# Patient Record
Sex: FEMALE | Race: WHITE | NOT HISPANIC OR LATINO
[De-identification: names, ages, dates, MRNs, and addresses within clinical notes are randomized per-mention and may not be internally consistent; named-entity substitution may affect disease eponyms.]

---

## 2016-11-24 RX ORDER — LANSOPRAZOLE 15 MG/1
30 CAPSULE, DELAYED RELEASE ORAL
Qty: 0 | Refills: 0 | DISCHARGE
Start: 2016-11-24

## 2017-01-05 PROBLEM — A04.7 C. DIFFICILE DIARRHEA: Status: RESOLVED | Noted: 2017-01-05 | Resolved: 2017-01-05

## 2017-01-05 PROBLEM — Z86.39 HISTORY OF HYPOTHYROIDISM: Status: RESOLVED | Noted: 2017-01-05 | Resolved: 2017-01-05

## 2017-01-05 PROBLEM — Z86.39 HISTORY OF HYPERLIPIDEMIA: Status: RESOLVED | Noted: 2017-01-05 | Resolved: 2017-01-05

## 2017-01-05 PROBLEM — Z95.5 S/P CORONARY ARTERY STENT PLACEMENT: Status: ACTIVE | Noted: 2017-01-05

## 2017-01-05 PROBLEM — I25.119 CORONARY ARTERY DISEASE INVOLVING NATIVE CORONARY ARTERY OF NATIVE HEART WITH ANGINA PECTORIS: Status: RESOLVED | Noted: 2017-01-05 | Resolved: 2017-01-05

## 2017-01-24 ENCOUNTER — MESSAGE (OUTPATIENT)
Age: 77
End: 2017-01-24

## 2017-02-19 ENCOUNTER — EMERGENCY (EMERGENCY)
Facility: HOSPITAL | Age: 77
LOS: 1 days | Discharge: HOME | End: 2017-02-19
Attending: EMERGENCY MEDICINE
Payer: MEDICARE

## 2017-02-19 VITALS
OXYGEN SATURATION: 97 % | SYSTOLIC BLOOD PRESSURE: 119 MMHG | RESPIRATION RATE: 20 BRPM | DIASTOLIC BLOOD PRESSURE: 69 MMHG | TEMPERATURE: 98 F | HEART RATE: 88 BPM

## 2017-02-19 VITALS
OXYGEN SATURATION: 97 % | TEMPERATURE: 98 F | SYSTOLIC BLOOD PRESSURE: 125 MMHG | HEART RATE: 102 BPM | WEIGHT: 171.08 LBS | DIASTOLIC BLOOD PRESSURE: 88 MMHG | HEIGHT: 66 IN | RESPIRATION RATE: 24 BRPM

## 2017-02-19 DIAGNOSIS — Z91.040 LATEX ALLERGY STATUS: ICD-10-CM

## 2017-02-19 DIAGNOSIS — Z90.49 ACQUIRED ABSENCE OF OTHER SPECIFIED PARTS OF DIGESTIVE TRACT: Chronic | ICD-10-CM

## 2017-02-19 DIAGNOSIS — Z88.0 ALLERGY STATUS TO PENICILLIN: ICD-10-CM

## 2017-02-19 DIAGNOSIS — Z79.82 LONG TERM (CURRENT) USE OF ASPIRIN: ICD-10-CM

## 2017-02-19 DIAGNOSIS — F43.20 ADJUSTMENT DISORDER, UNSPECIFIED: ICD-10-CM

## 2017-02-19 DIAGNOSIS — Z90.89 ACQUIRED ABSENCE OF OTHER ORGANS: ICD-10-CM

## 2017-02-19 DIAGNOSIS — R00.2 PALPITATIONS: ICD-10-CM

## 2017-02-19 DIAGNOSIS — I10 ESSENTIAL (PRIMARY) HYPERTENSION: ICD-10-CM

## 2017-02-19 DIAGNOSIS — Z88.7 ALLERGY STATUS TO SERUM AND VACCINE: ICD-10-CM

## 2017-02-19 DIAGNOSIS — Z90.89 ACQUIRED ABSENCE OF OTHER ORGANS: Chronic | ICD-10-CM

## 2017-02-19 DIAGNOSIS — I25.10 ATHEROSCLEROTIC HEART DISEASE OF NATIVE CORONARY ARTERY WITHOUT ANGINA PECTORIS: ICD-10-CM

## 2017-02-19 LAB
ALBUMIN SERPL ELPH-MCNC: 4.4 G/DL — SIGNIFICANT CHANGE UP (ref 3.3–5.2)
ALP SERPL-CCNC: 49 U/L — SIGNIFICANT CHANGE UP (ref 40–120)
ALT FLD-CCNC: 22 U/L — SIGNIFICANT CHANGE UP
ANION GAP SERPL CALC-SCNC: 17 MMOL/L — SIGNIFICANT CHANGE UP (ref 5–17)
AST SERPL-CCNC: 22 U/L — SIGNIFICANT CHANGE UP
BASOPHILS # BLD AUTO: 0 K/UL — SIGNIFICANT CHANGE UP (ref 0–0.2)
BASOPHILS NFR BLD AUTO: 0.4 % — SIGNIFICANT CHANGE UP (ref 0–2)
BILIRUB SERPL-MCNC: 0.2 MG/DL — LOW (ref 0.4–2)
BUN SERPL-MCNC: 31 MG/DL — HIGH (ref 8–20)
CALCIUM SERPL-MCNC: 9.6 MG/DL — SIGNIFICANT CHANGE UP (ref 8.6–10.2)
CHLORIDE SERPL-SCNC: 100 MMOL/L — SIGNIFICANT CHANGE UP (ref 98–107)
CK SERPL-CCNC: 30 U/L — SIGNIFICANT CHANGE UP (ref 25–170)
CO2 SERPL-SCNC: 25 MMOL/L — SIGNIFICANT CHANGE UP (ref 22–29)
CREAT SERPL-MCNC: 0.8 MG/DL — SIGNIFICANT CHANGE UP (ref 0.5–1.3)
EOSINOPHIL # BLD AUTO: 0.1 K/UL — SIGNIFICANT CHANGE UP (ref 0–0.5)
EOSINOPHIL NFR BLD AUTO: 1.6 % — SIGNIFICANT CHANGE UP (ref 0–6)
GLUCOSE SERPL-MCNC: 109 MG/DL — SIGNIFICANT CHANGE UP (ref 70–115)
HCT VFR BLD CALC: 40.6 % — SIGNIFICANT CHANGE UP (ref 37–47)
HGB BLD-MCNC: 13.6 G/DL — SIGNIFICANT CHANGE UP (ref 12–16)
LYMPHOCYTES # BLD AUTO: 1.8 K/UL — SIGNIFICANT CHANGE UP (ref 1–4.8)
LYMPHOCYTES # BLD AUTO: 32.5 % — SIGNIFICANT CHANGE UP (ref 20–55)
MCHC RBC-ENTMCNC: 30.6 PG — SIGNIFICANT CHANGE UP (ref 27–31)
MCHC RBC-ENTMCNC: 33.5 G/DL — SIGNIFICANT CHANGE UP (ref 32–36)
MCV RBC AUTO: 91.2 FL — SIGNIFICANT CHANGE UP (ref 81–99)
MONOCYTES # BLD AUTO: 0.5 K/UL — SIGNIFICANT CHANGE UP (ref 0–0.8)
MONOCYTES NFR BLD AUTO: 8.6 % — SIGNIFICANT CHANGE UP (ref 3–10)
NEUTROPHILS # BLD AUTO: 3.2 K/UL — SIGNIFICANT CHANGE UP (ref 1.8–8)
NEUTROPHILS NFR BLD AUTO: 56.7 % — SIGNIFICANT CHANGE UP (ref 37–73)
PLATELET # BLD AUTO: 191 K/UL — SIGNIFICANT CHANGE UP (ref 150–400)
POTASSIUM SERPL-MCNC: 3.5 MMOL/L — SIGNIFICANT CHANGE UP (ref 3.5–5.3)
POTASSIUM SERPL-SCNC: 3.5 MMOL/L — SIGNIFICANT CHANGE UP (ref 3.5–5.3)
PROT SERPL-MCNC: 6.8 G/DL — SIGNIFICANT CHANGE UP (ref 6.6–8.7)
RBC # BLD: 4.45 M/UL — SIGNIFICANT CHANGE UP (ref 4.4–5.2)
RBC # FLD: 12.7 % — SIGNIFICANT CHANGE UP (ref 11–15.6)
SODIUM SERPL-SCNC: 142 MMOL/L — SIGNIFICANT CHANGE UP (ref 135–145)
TROPONIN T SERPL-MCNC: <0.01 NG/ML — SIGNIFICANT CHANGE UP (ref 0–0.06)
WBC # BLD: 5.7 K/UL — SIGNIFICANT CHANGE UP (ref 4.8–10.8)
WBC # FLD AUTO: 5.7 K/UL — SIGNIFICANT CHANGE UP (ref 4.8–10.8)

## 2017-02-19 PROCEDURE — 93005 ELECTROCARDIOGRAM TRACING: CPT

## 2017-02-19 PROCEDURE — 82550 ASSAY OF CK (CPK): CPT

## 2017-02-19 PROCEDURE — 84484 ASSAY OF TROPONIN QUANT: CPT

## 2017-02-19 PROCEDURE — 85027 COMPLETE CBC AUTOMATED: CPT

## 2017-02-19 PROCEDURE — 99285 EMERGENCY DEPT VISIT HI MDM: CPT

## 2017-02-19 PROCEDURE — 99283 EMERGENCY DEPT VISIT LOW MDM: CPT | Mod: 25

## 2017-02-19 PROCEDURE — 80053 COMPREHEN METABOLIC PANEL: CPT

## 2017-02-19 PROCEDURE — 93010 ELECTROCARDIOGRAM REPORT: CPT

## 2017-02-19 NOTE — ED ADULT TRIAGE NOTE - CHIEF COMPLAINT QUOTE
pt c/o sob s/p arugment with relative denies chest pain skin warm and dry. pt states she had stents placed on thanksgiving 2016

## 2017-02-19 NOTE — ED ADULT NURSE NOTE - OBJECTIVE STATEMENT
rcd pt on stretcher, a/ox3, in no apparent distress, states she had gotten into argument with daughter and afterwards "felt a rapid heartbeat and found it difficult to breath, I had 3 glasses of wine tonight" pt now with even and unlabored respirations, HR controlled a fib on cardiac monitor, denies chest pain or dizziness, pt with h/o of "heart attack on thanksgiving 2016"

## 2017-02-19 NOTE — ED PROVIDER NOTE - OBJECTIVE STATEMENT
This patient is a 77 year old woman with a past medical history of HTN and CAD with 2 stents who presents to the ED c/o palpitations and SOB after an argument with her daughter.  Patient states that she "lost it" and screamed, yelled, and threw a chair.  She was This patient is a 77 year old woman with a past medical history of HTN and CAD with 2 stents who presents to the ED c/o palpitations and SOB after an argument with her daughter.  Patient states that she "lost it" and screamed, yelled, and threw a chair.  She states that the SOB has improved since being transported to the ED and waiting in the ED.  The palpitations have decreased but are still present.  Patient states that she has not had an outburst like this in over 30 years.

## 2017-03-28 ENCOUNTER — APPOINTMENT (OUTPATIENT)
Dept: CARDIOLOGY | Facility: CLINIC | Age: 77
End: 2017-03-28

## 2017-03-28 ENCOUNTER — NON-APPOINTMENT (OUTPATIENT)
Age: 77
End: 2017-03-28

## 2017-03-28 VITALS
WEIGHT: 169 LBS | DIASTOLIC BLOOD PRESSURE: 80 MMHG | HEIGHT: 66 IN | BODY MASS INDEX: 27.16 KG/M2 | SYSTOLIC BLOOD PRESSURE: 116 MMHG | OXYGEN SATURATION: 98 % | HEART RATE: 91 BPM

## 2017-05-05 ENCOUNTER — EMERGENCY (EMERGENCY)
Facility: HOSPITAL | Age: 77
LOS: 1 days | Discharge: HOME | End: 2017-05-05
Attending: EMERGENCY MEDICINE
Payer: MEDICARE

## 2017-05-05 ENCOUNTER — APPOINTMENT (OUTPATIENT)
Dept: CARDIOLOGY | Facility: CLINIC | Age: 77
End: 2017-05-05

## 2017-05-05 VITALS
DIASTOLIC BLOOD PRESSURE: 99 MMHG | SYSTOLIC BLOOD PRESSURE: 146 MMHG | WEIGHT: 169.09 LBS | RESPIRATION RATE: 20 BRPM | TEMPERATURE: 98 F | HEIGHT: 66 IN | HEART RATE: 70 BPM | OXYGEN SATURATION: 99 %

## 2017-05-05 VITALS — OXYGEN SATURATION: 97 % | DIASTOLIC BLOOD PRESSURE: 79 MMHG | HEART RATE: 71 BPM | SYSTOLIC BLOOD PRESSURE: 128 MMHG

## 2017-05-05 VITALS
RESPIRATION RATE: 20 BRPM | HEART RATE: 86 BPM | OXYGEN SATURATION: 99 % | SYSTOLIC BLOOD PRESSURE: 148 MMHG | DIASTOLIC BLOOD PRESSURE: 89 MMHG

## 2017-05-05 DIAGNOSIS — R23.3 SPONTANEOUS ECCHYMOSES: ICD-10-CM

## 2017-05-05 DIAGNOSIS — Z91.040 LATEX ALLERGY STATUS: ICD-10-CM

## 2017-05-05 DIAGNOSIS — Z90.49 ACQUIRED ABSENCE OF OTHER SPECIFIED PARTS OF DIGESTIVE TRACT: ICD-10-CM

## 2017-05-05 DIAGNOSIS — Z88.7 ALLERGY STATUS TO SERUM AND VACCINE: ICD-10-CM

## 2017-05-05 DIAGNOSIS — I10 ESSENTIAL (PRIMARY) HYPERTENSION: ICD-10-CM

## 2017-05-05 DIAGNOSIS — Z79.82 LONG TERM (CURRENT) USE OF ASPIRIN: ICD-10-CM

## 2017-05-05 DIAGNOSIS — Z90.49 ACQUIRED ABSENCE OF OTHER SPECIFIED PARTS OF DIGESTIVE TRACT: Chronic | ICD-10-CM

## 2017-05-05 DIAGNOSIS — Z90.89 ACQUIRED ABSENCE OF OTHER ORGANS: ICD-10-CM

## 2017-05-05 DIAGNOSIS — Z90.89 ACQUIRED ABSENCE OF OTHER ORGANS: Chronic | ICD-10-CM

## 2017-05-05 DIAGNOSIS — Z88.0 ALLERGY STATUS TO PENICILLIN: ICD-10-CM

## 2017-05-05 LAB
ALBUMIN SERPL ELPH-MCNC: 4.6 G/DL — SIGNIFICANT CHANGE UP (ref 3.3–5.2)
ALP SERPL-CCNC: 50 U/L — SIGNIFICANT CHANGE UP (ref 40–120)
ALT FLD-CCNC: 28 U/L — SIGNIFICANT CHANGE UP
ANION GAP SERPL CALC-SCNC: 11 MMOL/L — SIGNIFICANT CHANGE UP (ref 5–17)
APTT BLD: 33.1 SEC — SIGNIFICANT CHANGE UP (ref 27.5–37.4)
AST SERPL-CCNC: 28 U/L — SIGNIFICANT CHANGE UP
BILIRUB SERPL-MCNC: 0.6 MG/DL — SIGNIFICANT CHANGE UP (ref 0.4–2)
BUN SERPL-MCNC: 26 MG/DL — HIGH (ref 8–20)
CALCIUM SERPL-MCNC: 10.4 MG/DL — HIGH (ref 8.6–10.2)
CHLORIDE SERPL-SCNC: 99 MMOL/L — SIGNIFICANT CHANGE UP (ref 98–107)
CO2 SERPL-SCNC: 30 MMOL/L — HIGH (ref 22–29)
CREAT SERPL-MCNC: 0.8 MG/DL — SIGNIFICANT CHANGE UP (ref 0.5–1.3)
GLUCOSE SERPL-MCNC: 117 MG/DL — HIGH (ref 70–115)
HCT VFR BLD CALC: 44 % — SIGNIFICANT CHANGE UP (ref 37–47)
HGB BLD-MCNC: 14.6 G/DL — SIGNIFICANT CHANGE UP (ref 12–16)
INR BLD: 1.31 RATIO — HIGH (ref 0.88–1.16)
MCHC RBC-ENTMCNC: 30.7 PG — SIGNIFICANT CHANGE UP (ref 27–31)
MCHC RBC-ENTMCNC: 33.2 G/DL — SIGNIFICANT CHANGE UP (ref 32–36)
MCV RBC AUTO: 92.4 FL — SIGNIFICANT CHANGE UP (ref 81–99)
PLATELET # BLD AUTO: 194 K/UL — SIGNIFICANT CHANGE UP (ref 150–400)
POTASSIUM SERPL-MCNC: 4.1 MMOL/L — SIGNIFICANT CHANGE UP (ref 3.5–5.3)
POTASSIUM SERPL-SCNC: 4.1 MMOL/L — SIGNIFICANT CHANGE UP (ref 3.5–5.3)
PROT SERPL-MCNC: 7.7 G/DL — SIGNIFICANT CHANGE UP (ref 6.6–8.7)
PROTHROM AB SERPL-ACNC: 14.5 SEC — HIGH (ref 9.8–12.7)
RBC # BLD: 4.76 M/UL — SIGNIFICANT CHANGE UP (ref 4.4–5.2)
RBC # FLD: 13 % — SIGNIFICANT CHANGE UP (ref 11–15.6)
SODIUM SERPL-SCNC: 140 MMOL/L — SIGNIFICANT CHANGE UP (ref 135–145)
WBC # BLD: 5.2 K/UL — SIGNIFICANT CHANGE UP (ref 4.8–10.8)
WBC # FLD AUTO: 5.2 K/UL — SIGNIFICANT CHANGE UP (ref 4.8–10.8)

## 2017-05-05 PROCEDURE — 99283 EMERGENCY DEPT VISIT LOW MDM: CPT | Mod: 25

## 2017-05-05 PROCEDURE — 85610 PROTHROMBIN TIME: CPT

## 2017-05-05 PROCEDURE — 85027 COMPLETE CBC AUTOMATED: CPT

## 2017-05-05 PROCEDURE — 80053 COMPREHEN METABOLIC PANEL: CPT

## 2017-05-05 PROCEDURE — 85730 THROMBOPLASTIN TIME PARTIAL: CPT

## 2017-05-05 PROCEDURE — 99284 EMERGENCY DEPT VISIT MOD MDM: CPT

## 2017-05-05 PROCEDURE — 93005 ELECTROCARDIOGRAM TRACING: CPT

## 2017-05-05 PROCEDURE — 93010 ELECTROCARDIOGRAM REPORT: CPT

## 2017-05-05 NOTE — CONSULT NOTE ADULT - ASSESSMENT
76 y/o F taking Xarelto and Plavix w/ PMHx of stented CAD and MI presents to the ED c/o diffused bruising throughout her body. Pt denies HA, SOB, fever, chills or any other complaints at this time.   CV ROS: patient denies cp , orthopnea, PND LE edema (+) brushing on legs, shins , and on road all systems reviewed otherwise negative except as above. Currently stable on exam bruising noted , ECG AF chronic  ,plts 194K.  Case discussed with Dr Franco  recommendation is to change Plavix from daily dosing to TIW --- M,W,F and follow-up in Cardiology clinic in two weeks, continue Xarelto and other CV medications at existing doses.

## 2017-05-05 NOTE — ED ADULT NURSE NOTE - OBJECTIVE STATEMENT
received pt AOx3 c/o increased bruising, pt on Xarelto and Plavix. started on blood thinners last november. denies any dark stools, hematuria, epistaxis, dizziness, SOB and CP. pt resp even unlabored, MAEx4, neuro intact. Small bruise noted under left eye and to right shin. denies any injury or trauma. Pt appears well in skin color. will continue to monitor.

## 2017-05-05 NOTE — ED PROVIDER NOTE - PROGRESS NOTE DETAILS
pt evaluated by Dr Ha and states d/w Dr Franco and recommend to continue with xarelto but change her plavix to 3 times per week and f/u with Gandotra in 2 wks.

## 2017-05-05 NOTE — ED PROVIDER NOTE - OBJECTIVE STATEMENT
76 y/o female in ED c/o easy bruising x 7 months since starting plavix and xarelto.  pt denies any GI bleeding.  pt denies any fever, HA, cp, sob, n/v/d/abd pain.  tolerating PO.  no sick contacts or recent travel.

## 2017-05-05 NOTE — CONSULT NOTE ADULT - SUBJECTIVE AND OBJECTIVE BOX
Patient is a 77y old  Female who presents with a chief complaint of     HPI:  78 y/o F taking Xarelto and Plavix w/ PMHx of stented CAD and MI presents to the ED c/o diffused bruising throughout her body. Pt denies HA, SOB, fever, chills or any other complaints at this time.   CV ROS: patient denies cp , orthopnea, PND LE edema (+) brushing on legs, shins , and on road all systems reviewed otherwise negative except as above    PAST MEDICAL & SURGICAL HISTORY:  Myocardial infarction  Essential hypertension  Afib  S/P appendectomy  S/P tonsillectomy      PREVIOUS DIAGNOSTIC TESTING:      ECHO  FINDINGS:11/28/2017     Summary:   1. Limited echo done at the bedside with  for pericardial   effusion.   2. There is no evidence of pericardial effusion.   3. Left ventricular ejection fraction, by visual estimation, is 55 to   60%.        CATHETERIZATION  FINDINGS 11/28/2017    :VENTRICLES: No LV gram was performed; however, a recent echocardiogram  demonstrated an EF of 50 %.  CORONARY VESSELS: The coronary circulation is right dominant.  LM:   --  LM: Normal.  LAD:   --  Mid LAD: There was a diffuse 70 % stenosis. There was MASOOD grade  3 flow through the vessel (brisk flow).  CX:   --  OM1: There was a 20 % stenosis. By IVUS. In a second lesion,  there was a 0 % stenosis in-stent.  RCA:   --  RCA: This vessel was not injected.      Allergies    latex (Anaphylaxis)  penicillin (Rash)  tetanus-diphtheria toxoids (Unknown)    Intolerances        MEDICATIONS  (STANDING):    MEDICATIONS  (PRN):      FAMILY HISTORY:  No pertinent family history in first degree relatives  No pertinent family history in first degree relatives      SOCIAL HISTORY: Lives with family    CIGARETTES:deneis    ALCOHOL:denies    REVIEW OF SYSTEMS:  CONSTITUTIONAL: No fever, weight loss, or fatigue  EYES: No eye pain, visual disturbances, or discharge  ENMT:  No difficulty hearing, tinnitus, vertigo; No sinus or throat pain  NECK: No pain or stiffness  RESPIRATORY: No cough, wheezing, chills or hemoptysis; No Shortness of Breath  CARDIOVASCULAR: No chest pain, palpitations, passing out, dizziness, or leg swelling  GASTROINTESTINAL: No abdominal or epigastric pain. No nausea, vomiting, or hematemesis; No diarrhea or constipation. No melena or hematochezia.  GENITOURINARY: No dysuria, frequency, hematuria, or incontinence  NEUROLOGICAL: No headaches, memory loss, loss of strength, numbness, or tremors  SKIN: No itching, burning, rashes, or lesions   LYMPH Nodes: No enlarged glands  ENDOCRINE: No heat or cold intolerance; No hair loss  MUSCULOSKELETAL: No joint pain or swelling; No muscle, back, or extremity pain  PSYCHIATRIC: No depression, anxiety, mood swings, or difficulty sleeping  HEME/LYMPH: No easy bruising, or bleeding gums  ALLERY AND IMMUNOLOGIC: No hives or eczema	    Vital Signs Last 24 Hrs  T(C): 36.7, Max: 36.7 (05-05 @ 11:33)  T(F): 98, Max: 98 (05-05 @ 11:33)  HR: 80 (70 - 80)  BP: 150/90 (146/99 - 150/90)  BP(mean): --  RR: 20 (20 - 20)  SpO2: 99% (99% - 99%)    Daily Height in cm: 167.64 (05 May 2017 11:33)    Daily     I&O's Detail      PHYSICAL EXAM:  Appearance: Normal, well nourished	  HEENT:   Normal oral mucosa, PERRL, EOMI, sclera non-icteric	  Lymphatic: No cervical lymphadenopathy  Cardiovascular: Normal S1 S2, No JVD, No cardiac murmurs, No carotid bruits, No peripheral edema  Respiratory: Lungs clear to auscultation	  Psychiatry: A & O x 3, Mood & affect appropriate  Gastrointestinal:  Soft, Non-tender, + BS, no bruits	  Skin: No rashes, No ecchymoses, No cyanosis  Neurologic: Grossly non-focal with full strength in all four extremities  Extremities: Normal range of motion, No clubbing, cyanosis or edema  Vascular: Peripheral pulses palpable 2+ bilaterally      INTERPRETATION OF TELEMETRY:    ECG: AR with ns st twa    LABS:                        14.6   5.2   )-----------( 194      ( 05 May 2017 12:27 )             44.0     05-05    140  |  99  |  26.0<H>  ----------------------------<  117<H>  4.1   |  30.0<H>  |  0.80    Ca    10.4<H>      05 May 2017 12:27    TPro  7.7  /  Alb  4.6  /  TBili  0.6  /  DBili  x   /  AST  28  /  ALT  28  /  AlkPhos  50  05-05        PT/INR - ( 05 May 2017 12:27 )   PT: 14.5 sec;   INR: 1.31 ratio         PTT - ( 05 May 2017 12:27 )  PTT:33.1 sec    I&O's Summary    BNP  RADIOLOGY & ADDITIONAL STUDIES:

## 2017-05-05 NOTE — ED STATDOCS - PROGRESS NOTE DETAILS
78 y/o F taking Xarelto and Plavix w/ PMHx of stented CAD and MI presents to the ED c/o diffused bruising throughout her body. Pt denies HA, SOB, fever, chills or any other complaints at this time. Pt aware of the risks of being on blood thinners. Her Cardiologist is Dr. Smyth. Tx: blood work, labs, and cardiology consult. Will obtain EKG. Pt will be sent to [facility] for further evaluation by another provider.

## 2017-05-05 NOTE — ED STATDOCS - DETAILS:
I, Zach Lui, performed the initial face to face bedside interview with this patient regarding history of present illness, review of symptoms and relevant past medical, social and family history.  I completed an independent physical examination.    The history, relevant review of systems, past medical and surgical history, medical decision making, and physical examination was documented by the scribe in my presence and I attest to the accuracy of the documentation.

## 2017-05-18 ENCOUNTER — APPOINTMENT (OUTPATIENT)
Dept: CARDIOLOGY | Facility: CLINIC | Age: 77
End: 2017-05-18

## 2017-05-18 ENCOUNTER — NON-APPOINTMENT (OUTPATIENT)
Age: 77
End: 2017-05-18

## 2017-05-18 VITALS
WEIGHT: 171 LBS | DIASTOLIC BLOOD PRESSURE: 69 MMHG | HEIGHT: 66 IN | OXYGEN SATURATION: 99 % | BODY MASS INDEX: 27.48 KG/M2 | HEART RATE: 79 BPM | SYSTOLIC BLOOD PRESSURE: 135 MMHG

## 2017-08-03 ENCOUNTER — APPOINTMENT (OUTPATIENT)
Dept: CARDIOLOGY | Facility: CLINIC | Age: 77
End: 2017-08-03
Payer: MEDICARE

## 2017-08-03 ENCOUNTER — NON-APPOINTMENT (OUTPATIENT)
Age: 77
End: 2017-08-03

## 2017-08-03 VITALS
DIASTOLIC BLOOD PRESSURE: 70 MMHG | WEIGHT: 169 LBS | HEART RATE: 84 BPM | OXYGEN SATURATION: 97 % | SYSTOLIC BLOOD PRESSURE: 109 MMHG | HEIGHT: 66 IN | BODY MASS INDEX: 27.16 KG/M2

## 2017-08-03 PROCEDURE — 99215 OFFICE O/P EST HI 40 MIN: CPT | Mod: 25

## 2017-08-03 PROCEDURE — 93000 ELECTROCARDIOGRAM COMPLETE: CPT

## 2017-09-11 ENCOUNTER — APPOINTMENT (OUTPATIENT)
Dept: ORTHOPEDIC SURGERY | Facility: CLINIC | Age: 77
End: 2017-09-11
Payer: MEDICARE

## 2017-09-11 VITALS
SYSTOLIC BLOOD PRESSURE: 148 MMHG | BODY MASS INDEX: 27.16 KG/M2 | WEIGHT: 169 LBS | HEART RATE: 79 BPM | HEIGHT: 66 IN | DIASTOLIC BLOOD PRESSURE: 87 MMHG

## 2017-09-11 DIAGNOSIS — Z86.79 PERSONAL HISTORY OF OTHER DISEASES OF THE CIRCULATORY SYSTEM: ICD-10-CM

## 2017-09-11 DIAGNOSIS — Z78.9 OTHER SPECIFIED HEALTH STATUS: ICD-10-CM

## 2017-09-11 DIAGNOSIS — Z86.39 PERSONAL HISTORY OF OTHER ENDOCRINE, NUTRITIONAL AND METABOLIC DISEASE: ICD-10-CM

## 2017-09-11 DIAGNOSIS — M19.041 PRIMARY OSTEOARTHRITIS, RIGHT HAND: ICD-10-CM

## 2017-09-11 DIAGNOSIS — Z87.39 PERSONAL HISTORY OF OTHER DISEASES OF THE MUSCULOSKELETAL SYSTEM AND CONNECTIVE TISSUE: ICD-10-CM

## 2017-09-11 PROCEDURE — 73130 X-RAY EXAM OF HAND: CPT | Mod: RT

## 2017-09-11 PROCEDURE — 99204 OFFICE O/P NEW MOD 45 MIN: CPT

## 2017-09-27 ENCOUNTER — OUTPATIENT (OUTPATIENT)
Dept: OUTPATIENT SERVICES | Facility: HOSPITAL | Age: 77
LOS: 1 days | End: 2017-09-27
Payer: MEDICARE

## 2017-09-27 VITALS
WEIGHT: 167.55 LBS | SYSTOLIC BLOOD PRESSURE: 118 MMHG | HEART RATE: 86 BPM | HEIGHT: 66 IN | TEMPERATURE: 98 F | DIASTOLIC BLOOD PRESSURE: 72 MMHG | RESPIRATION RATE: 16 BRPM

## 2017-09-27 DIAGNOSIS — Z90.49 ACQUIRED ABSENCE OF OTHER SPECIFIED PARTS OF DIGESTIVE TRACT: Chronic | ICD-10-CM

## 2017-09-27 DIAGNOSIS — Z01.818 ENCOUNTER FOR OTHER PREPROCEDURAL EXAMINATION: ICD-10-CM

## 2017-09-27 DIAGNOSIS — Z90.89 ACQUIRED ABSENCE OF OTHER ORGANS: Chronic | ICD-10-CM

## 2017-09-27 DIAGNOSIS — I10 ESSENTIAL (PRIMARY) HYPERTENSION: ICD-10-CM

## 2017-09-27 DIAGNOSIS — Z98.51 TUBAL LIGATION STATUS: Chronic | ICD-10-CM

## 2017-09-27 DIAGNOSIS — I48.91 UNSPECIFIED ATRIAL FIBRILLATION: ICD-10-CM

## 2017-09-27 DIAGNOSIS — M67.449 GANGLION, UNSPECIFIED HAND: ICD-10-CM

## 2017-09-27 DIAGNOSIS — Z82.49 FAMILY HISTORY OF ISCHEMIC HEART DISEASE AND OTHER DISEASES OF THE CIRCULATORY SYSTEM: Chronic | ICD-10-CM

## 2017-09-27 DIAGNOSIS — E78.00 PURE HYPERCHOLESTEROLEMIA, UNSPECIFIED: ICD-10-CM

## 2017-09-27 DIAGNOSIS — I21.3 ST ELEVATION (STEMI) MYOCARDIAL INFARCTION OF UNSPECIFIED SITE: ICD-10-CM

## 2017-09-27 LAB
ANION GAP SERPL CALC-SCNC: 13 MMOL/L — SIGNIFICANT CHANGE UP (ref 5–17)
APTT BLD: 32.1 SEC — SIGNIFICANT CHANGE UP (ref 27.5–37.4)
BUN SERPL-MCNC: 22 MG/DL — HIGH (ref 8–20)
CALCIUM SERPL-MCNC: 10 MG/DL — SIGNIFICANT CHANGE UP (ref 8.6–10.2)
CHLORIDE SERPL-SCNC: 99 MMOL/L — SIGNIFICANT CHANGE UP (ref 98–107)
CO2 SERPL-SCNC: 29 MMOL/L — SIGNIFICANT CHANGE UP (ref 22–29)
CREAT SERPL-MCNC: 1.07 MG/DL — SIGNIFICANT CHANGE UP (ref 0.5–1.3)
GLUCOSE SERPL-MCNC: 121 MG/DL — HIGH (ref 70–115)
HCT VFR BLD CALC: 41.6 % — SIGNIFICANT CHANGE UP (ref 37–47)
HGB BLD-MCNC: 13.8 G/DL — SIGNIFICANT CHANGE UP (ref 12–16)
INR BLD: 1.38 RATIO — HIGH (ref 0.88–1.16)
MCHC RBC-ENTMCNC: 30.7 PG — SIGNIFICANT CHANGE UP (ref 27–31)
MCHC RBC-ENTMCNC: 33.2 G/DL — SIGNIFICANT CHANGE UP (ref 32–36)
MCV RBC AUTO: 92.7 FL — SIGNIFICANT CHANGE UP (ref 81–99)
PLATELET # BLD AUTO: 208 K/UL — SIGNIFICANT CHANGE UP (ref 150–400)
POTASSIUM SERPL-MCNC: 4.2 MMOL/L — SIGNIFICANT CHANGE UP (ref 3.5–5.3)
POTASSIUM SERPL-SCNC: 4.2 MMOL/L — SIGNIFICANT CHANGE UP (ref 3.5–5.3)
PROTHROM AB SERPL-ACNC: 15.3 SEC — HIGH (ref 9.8–12.7)
RBC # BLD: 4.49 M/UL — SIGNIFICANT CHANGE UP (ref 4.4–5.2)
RBC # FLD: 13 % — SIGNIFICANT CHANGE UP (ref 11–15.6)
SODIUM SERPL-SCNC: 141 MMOL/L — SIGNIFICANT CHANGE UP (ref 135–145)
WBC # BLD: 5.7 K/UL — SIGNIFICANT CHANGE UP (ref 4.8–10.8)
WBC # FLD AUTO: 5.7 K/UL — SIGNIFICANT CHANGE UP (ref 4.8–10.8)

## 2017-09-27 PROCEDURE — 85610 PROTHROMBIN TIME: CPT

## 2017-09-27 PROCEDURE — 93010 ELECTROCARDIOGRAM REPORT: CPT

## 2017-09-27 PROCEDURE — G0463: CPT

## 2017-09-27 PROCEDURE — 93005 ELECTROCARDIOGRAM TRACING: CPT

## 2017-09-27 PROCEDURE — 85027 COMPLETE CBC AUTOMATED: CPT

## 2017-09-27 PROCEDURE — 85730 THROMBOPLASTIN TIME PARTIAL: CPT

## 2017-09-27 PROCEDURE — 36415 COLL VENOUS BLD VENIPUNCTURE: CPT

## 2017-09-27 PROCEDURE — 80048 BASIC METABOLIC PNL TOTAL CA: CPT

## 2017-09-27 NOTE — H&P PST ADULT - NSANTHOSAYNRD_GEN_A_CORE
No. SANTA screening performed.  STOP BANG Legend: 0-2 = LOW Risk; 3-4 = INTERMEDIATE Risk; 5-8 = HIGH Risk

## 2017-09-27 NOTE — H&P PST ADULT - ASSESSMENT
medications reviewed, instructions given on what medications to take and what not to take. medications reviewed, instructions given on what medications to take and what not to take. Asked the patient to consult with cardiologist about the Plavix and Xarelto whether to or when to stop these .will get cardiac clearance

## 2017-09-27 NOTE — H&P PST ADULT - PROBLEM SELECTOR PLAN 1
Removal of right index finger mucous cyst debridement of distal interphalangeal joint. Medical and cardiac clearance pending

## 2017-10-11 ENCOUNTER — RESULT REVIEW (OUTPATIENT)
Age: 77
End: 2017-10-11

## 2017-10-11 ENCOUNTER — OUTPATIENT (OUTPATIENT)
Dept: OUTPATIENT SERVICES | Facility: HOSPITAL | Age: 77
LOS: 1 days | End: 2017-10-11
Payer: MEDICARE

## 2017-10-11 ENCOUNTER — APPOINTMENT (OUTPATIENT)
Dept: ORTHOPEDIC SURGERY | Facility: HOSPITAL | Age: 77
End: 2017-10-11

## 2017-10-11 VITALS
SYSTOLIC BLOOD PRESSURE: 103 MMHG | OXYGEN SATURATION: 93 % | HEART RATE: 73 BPM | DIASTOLIC BLOOD PRESSURE: 64 MMHG | TEMPERATURE: 98 F | RESPIRATION RATE: 15 BRPM

## 2017-10-11 VITALS
SYSTOLIC BLOOD PRESSURE: 120 MMHG | HEIGHT: 66 IN | OXYGEN SATURATION: 99 % | WEIGHT: 167.55 LBS | RESPIRATION RATE: 16 BRPM | HEART RATE: 82 BPM | DIASTOLIC BLOOD PRESSURE: 64 MMHG | TEMPERATURE: 97 F

## 2017-10-11 DIAGNOSIS — Z90.89 ACQUIRED ABSENCE OF OTHER ORGANS: Chronic | ICD-10-CM

## 2017-10-11 DIAGNOSIS — M67.449 GANGLION, UNSPECIFIED HAND: ICD-10-CM

## 2017-10-11 DIAGNOSIS — Z82.49 FAMILY HISTORY OF ISCHEMIC HEART DISEASE AND OTHER DISEASES OF THE CIRCULATORY SYSTEM: Chronic | ICD-10-CM

## 2017-10-11 DIAGNOSIS — Z90.49 ACQUIRED ABSENCE OF OTHER SPECIFIED PARTS OF DIGESTIVE TRACT: Chronic | ICD-10-CM

## 2017-10-11 DIAGNOSIS — Z98.51 TUBAL LIGATION STATUS: Chronic | ICD-10-CM

## 2017-10-11 LAB
APTT BLD: 27.1 SEC — LOW (ref 27.5–37.4)
INR BLD: 1 RATIO — SIGNIFICANT CHANGE UP (ref 0.88–1.16)
PROTHROM AB SERPL-ACNC: 11 SEC — SIGNIFICANT CHANGE UP (ref 9.8–12.7)

## 2017-10-11 PROCEDURE — 26160 REMOVE TENDON SHEATH LESION: CPT | Mod: F6

## 2017-10-11 PROCEDURE — 88304 TISSUE EXAM BY PATHOLOGIST: CPT | Mod: 26

## 2017-10-11 PROCEDURE — 73140 X-RAY EXAM OF FINGER(S): CPT | Mod: 26,RT

## 2017-10-11 PROCEDURE — 26110 BIOPSY FINGER JOINT LINING: CPT | Mod: 59,F6

## 2017-10-11 PROCEDURE — 85610 PROTHROMBIN TIME: CPT

## 2017-10-11 PROCEDURE — 26210 REMOVAL OF FINGER LESION: CPT | Mod: F6

## 2017-10-11 PROCEDURE — 88305 TISSUE EXAM BY PATHOLOGIST: CPT | Mod: 26

## 2017-10-11 PROCEDURE — 85730 THROMBOPLASTIN TIME PARTIAL: CPT

## 2017-10-11 PROCEDURE — 36415 COLL VENOUS BLD VENIPUNCTURE: CPT

## 2017-10-11 PROCEDURE — 88305 TISSUE EXAM BY PATHOLOGIST: CPT

## 2017-10-11 PROCEDURE — 76000 FLUOROSCOPY <1 HR PHYS/QHP: CPT | Mod: 26

## 2017-10-11 PROCEDURE — 88304 TISSUE EXAM BY PATHOLOGIST: CPT

## 2017-10-11 RX ORDER — HYDROMORPHONE HYDROCHLORIDE 2 MG/ML
0.5 INJECTION INTRAMUSCULAR; INTRAVENOUS; SUBCUTANEOUS
Qty: 0 | Refills: 0 | Status: DISCONTINUED | OUTPATIENT
Start: 2017-10-11 | End: 2017-10-11

## 2017-10-11 RX ORDER — FENTANYL CITRATE 50 UG/ML
50 INJECTION INTRAVENOUS
Qty: 0 | Refills: 0 | Status: DISCONTINUED | OUTPATIENT
Start: 2017-10-11 | End: 2017-10-11

## 2017-10-11 RX ORDER — DEXAMETHASONE 0.5 MG/5ML
8 ELIXIR ORAL ONCE
Qty: 0 | Refills: 0 | Status: DISCONTINUED | OUTPATIENT
Start: 2017-10-11 | End: 2017-10-11

## 2017-10-11 RX ORDER — ONDANSETRON 8 MG/1
4 TABLET, FILM COATED ORAL ONCE
Qty: 0 | Refills: 0 | Status: DISCONTINUED | OUTPATIENT
Start: 2017-10-11 | End: 2017-10-11

## 2017-10-11 RX ORDER — SODIUM CHLORIDE 9 MG/ML
1000 INJECTION INTRAMUSCULAR; INTRAVENOUS; SUBCUTANEOUS
Qty: 0 | Refills: 0 | Status: DISCONTINUED | OUTPATIENT
Start: 2017-10-11 | End: 2017-10-11

## 2017-10-11 RX ORDER — IBUPROFEN 200 MG
1 TABLET ORAL
Qty: 30 | Refills: 0
Start: 2017-10-11

## 2017-10-11 RX ORDER — OXYCODONE AND ACETAMINOPHEN 5; 325 MG/1; MG/1
1 TABLET ORAL EVERY 4 HOURS
Qty: 0 | Refills: 0 | Status: DISCONTINUED | OUTPATIENT
Start: 2017-10-11 | End: 2017-10-11

## 2017-10-11 RX ORDER — OXYCODONE AND ACETAMINOPHEN 5; 325 MG/1; MG/1
2 TABLET ORAL EVERY 4 HOURS
Qty: 0 | Refills: 0 | Status: DISCONTINUED | OUTPATIENT
Start: 2017-10-11 | End: 2017-10-11

## 2017-10-11 RX ORDER — KETOROLAC TROMETHAMINE 30 MG/ML
15 SYRINGE (ML) INJECTION EVERY 8 HOURS
Qty: 0 | Refills: 0 | Status: DISCONTINUED | OUTPATIENT
Start: 2017-10-11 | End: 2017-10-11

## 2017-10-11 RX ORDER — ACETAMINOPHEN 500 MG
1000 TABLET ORAL ONCE
Qty: 0 | Refills: 0 | Status: DISCONTINUED | OUTPATIENT
Start: 2017-10-11 | End: 2017-10-11

## 2017-10-11 NOTE — ASU DISCHARGE PLAN (ADULT/PEDIATRIC). - MEDICATION SUMMARY - MEDICATIONS TO TAKE
I will START or STAY ON the medications listed below when I get home from the hospital:    ibuprofen 800 mg oral tablet  -- 1 tab(s) by mouth 3 times a day   -- Do not take this drug if you are pregnant.  It is very important that you take or use this exactly as directed.  Do not skip doses or discontinue unless directed by your doctor.  May cause drowsiness or dizziness.  Obtain medical advice before taking any non-prescription drugs as some may affect the action of this medication.  Take with food or milk.    -- Indication: For pain    isosorbide mononitrate 30 mg oral tablet, extended release  -- 1 tab(s) by mouth once a day  -- Indication: For home med    Xarelto 20 mg oral tablet  -- 1 tab(s) by mouth once a day (in the evening)  -- Indication: For home med    clonazePAM  -- orally once a day (at bedtime), As Needed  -- Indication: For home med    Allegra 180 mg oral tablet  -- 1 tab(s) by mouth once a day  -- Indication: For home med    fenofibrate 145 mg oral tablet  -- 1 tab(s) by mouth once a day  -- Indication: For home med    pravastatin 40 mg oral tablet  -- 1 tab(s) by mouth once a day  -- Indication: For home med    clopidogrel 75 mg oral tablet  -- 1 tab(s) by mouth once a day  -- Indication: For home med    metoprolol tartrate 50 mg oral tablet  -- 1 tab(s) by mouth 2 times a day  -- Indication: For home med    furosemide 40 mg oral tablet  -- 1 tab(s) by mouth once a day restart 12/4/16  -- Indication: For home med    lansoprazole 30 mg oral delayed release capsule  -- 30 milligram(s) by mouth 2 times a day  -- Indication: For home med    levothyroxine 150 mcg (0.15 mg) oral capsule  -- 1 cap(s) by mouth once a day  -- Indication: For home med    Multiple Vitamins oral tablet  -- 1 tab(s) by mouth once a day  -- Indication: For home med    Vitamin C 1000 mg oral tablet  -- 1 tab(s) by mouth twice per  day  -- Indication: For home med    Vitamin D3 2000 intl units oral capsule  -- 1 cap(s) by mouth once a day  -- Indication: For home med

## 2017-10-11 NOTE — ASU DISCHARGE PLAN (ADULT/PEDIATRIC). - NOTIFY
Pain not relieved by Medications/Numbness, color, or temperature change to extremity/Bleeding that does not stop Fever greater than 101/Numbness, color, or temperature change to extremity/Pain not relieved by Medications/Bleeding that does not stop

## 2017-10-11 NOTE — BRIEF OPERATIVE NOTE - PROCEDURE
<<-----Click on this checkbox to enter Procedure Ganglion cyst excision  10/11/2017  right index finger mucous cyst removal and debridement of DIP joint for arthritis  Active  MAME

## 2017-10-12 ENCOUNTER — TRANSCRIPTION ENCOUNTER (OUTPATIENT)
Age: 77
End: 2017-10-12

## 2017-10-19 ENCOUNTER — APPOINTMENT (OUTPATIENT)
Dept: ORTHOPEDIC SURGERY | Facility: CLINIC | Age: 77
End: 2017-10-19
Payer: MEDICARE

## 2017-10-19 VITALS
DIASTOLIC BLOOD PRESSURE: 89 MMHG | BODY MASS INDEX: 27.16 KG/M2 | SYSTOLIC BLOOD PRESSURE: 138 MMHG | WEIGHT: 169 LBS | HEIGHT: 66 IN

## 2017-10-19 PROCEDURE — 99024 POSTOP FOLLOW-UP VISIT: CPT

## 2017-10-26 ENCOUNTER — APPOINTMENT (OUTPATIENT)
Dept: ORTHOPEDIC SURGERY | Facility: CLINIC | Age: 77
End: 2017-10-26
Payer: MEDICARE

## 2017-10-26 VITALS
HEIGHT: 66 IN | HEART RATE: 84 BPM | WEIGHT: 169 LBS | SYSTOLIC BLOOD PRESSURE: 130 MMHG | BODY MASS INDEX: 27.16 KG/M2 | DIASTOLIC BLOOD PRESSURE: 83 MMHG

## 2017-10-26 DIAGNOSIS — Z98.890 OTHER SPECIFIED POSTPROCEDURAL STATES: ICD-10-CM

## 2017-10-26 PROCEDURE — 99024 POSTOP FOLLOW-UP VISIT: CPT

## 2017-11-07 ENCOUNTER — APPOINTMENT (OUTPATIENT)
Dept: ORTHOPEDIC SURGERY | Facility: CLINIC | Age: 77
End: 2017-11-07
Payer: MEDICARE

## 2017-11-07 VITALS
HEIGHT: 66 IN | WEIGHT: 169 LBS | DIASTOLIC BLOOD PRESSURE: 91 MMHG | BODY MASS INDEX: 27.16 KG/M2 | HEART RATE: 80 BPM | SYSTOLIC BLOOD PRESSURE: 127 MMHG

## 2017-11-07 PROCEDURE — 99024 POSTOP FOLLOW-UP VISIT: CPT

## 2017-11-20 ENCOUNTER — APPOINTMENT (OUTPATIENT)
Dept: ORTHOPEDIC SURGERY | Facility: CLINIC | Age: 77
End: 2017-11-20
Payer: MEDICARE

## 2017-11-20 VITALS
DIASTOLIC BLOOD PRESSURE: 88 MMHG | WEIGHT: 169 LBS | HEIGHT: 66 IN | BODY MASS INDEX: 27.16 KG/M2 | SYSTOLIC BLOOD PRESSURE: 134 MMHG | HEART RATE: 93 BPM

## 2017-11-20 DIAGNOSIS — M19.041 PRIMARY OSTEOARTHRITIS, RIGHT HAND: ICD-10-CM

## 2017-11-20 DIAGNOSIS — M67.449 GANGLION, UNSPECIFIED HAND: ICD-10-CM

## 2017-11-20 DIAGNOSIS — M25.841 OTHER SPECIFIED JOINT DISORDERS, RIGHT HAND: ICD-10-CM

## 2017-11-20 PROCEDURE — 99024 POSTOP FOLLOW-UP VISIT: CPT

## 2017-12-11 ENCOUNTER — OTHER (OUTPATIENT)
Age: 77
End: 2017-12-11

## 2017-12-11 RX ORDER — PRAVASTATIN SODIUM 80 MG/1
80 TABLET ORAL DAILY
Qty: 90 | Refills: 0 | Status: DISCONTINUED | COMMUNITY
End: 2017-12-11

## 2018-02-08 ENCOUNTER — NON-APPOINTMENT (OUTPATIENT)
Age: 78
End: 2018-02-08

## 2018-02-08 ENCOUNTER — APPOINTMENT (OUTPATIENT)
Dept: CARDIOLOGY | Facility: CLINIC | Age: 78
End: 2018-02-08
Payer: MEDICARE

## 2018-02-08 VITALS
OXYGEN SATURATION: 98 % | HEART RATE: 78 BPM | DIASTOLIC BLOOD PRESSURE: 76 MMHG | BODY MASS INDEX: 27.76 KG/M2 | WEIGHT: 172 LBS | SYSTOLIC BLOOD PRESSURE: 128 MMHG

## 2018-02-08 PROCEDURE — 93000 ELECTROCARDIOGRAM COMPLETE: CPT

## 2018-02-08 PROCEDURE — 99215 OFFICE O/P EST HI 40 MIN: CPT

## 2018-02-13 ENCOUNTER — APPOINTMENT (OUTPATIENT)
Dept: CARDIOLOGY | Facility: CLINIC | Age: 78
End: 2018-02-13
Payer: MEDICARE

## 2018-02-13 PROCEDURE — 93306 TTE W/DOPPLER COMPLETE: CPT

## 2018-07-16 PROBLEM — I21.3 ST ELEVATION (STEMI) MYOCARDIAL INFARCTION OF UNSPECIFIED SITE: Chronic | Status: ACTIVE | Noted: 2017-02-19

## 2018-10-18 ENCOUNTER — APPOINTMENT (OUTPATIENT)
Dept: CARDIOLOGY | Facility: CLINIC | Age: 78
End: 2018-10-18
Payer: MEDICARE

## 2018-10-18 VITALS
RESPIRATION RATE: 18 BRPM | HEART RATE: 80 BPM | DIASTOLIC BLOOD PRESSURE: 96 MMHG | BODY MASS INDEX: 28.93 KG/M2 | SYSTOLIC BLOOD PRESSURE: 142 MMHG | OXYGEN SATURATION: 96 % | HEIGHT: 66 IN | WEIGHT: 180 LBS

## 2018-10-18 PROBLEM — E78.00 PURE HYPERCHOLESTEROLEMIA, UNSPECIFIED: Chronic | Status: ACTIVE | Noted: 2017-09-27

## 2018-10-18 PROCEDURE — 99214 OFFICE O/P EST MOD 30 MIN: CPT

## 2018-10-18 PROCEDURE — 93000 ELECTROCARDIOGRAM COMPLETE: CPT

## 2018-10-18 RX ORDER — ROSUVASTATIN CALCIUM 20 MG/1
20 TABLET, FILM COATED ORAL DAILY
Qty: 30 | Refills: 5 | Status: DISCONTINUED | COMMUNITY
Start: 2017-12-11 | End: 2018-10-18

## 2018-11-01 ENCOUNTER — APPOINTMENT (OUTPATIENT)
Dept: CARDIOLOGY | Facility: CLINIC | Age: 78
End: 2018-11-01
Payer: MEDICARE

## 2018-11-01 PROCEDURE — 93015 CV STRESS TEST SUPVJ I&R: CPT

## 2018-11-01 PROCEDURE — A9500: CPT

## 2018-11-01 PROCEDURE — 78452 HT MUSCLE IMAGE SPECT MULT: CPT

## 2018-12-06 ENCOUNTER — APPOINTMENT (OUTPATIENT)
Dept: CARDIOLOGY | Facility: CLINIC | Age: 78
End: 2018-12-06

## 2019-06-20 ENCOUNTER — APPOINTMENT (OUTPATIENT)
Dept: CARDIOLOGY | Facility: CLINIC | Age: 79
End: 2019-06-20
Payer: MEDICARE

## 2019-06-20 ENCOUNTER — NON-APPOINTMENT (OUTPATIENT)
Age: 79
End: 2019-06-20

## 2019-06-20 VITALS
OXYGEN SATURATION: 98 % | HEIGHT: 66 IN | HEART RATE: 81 BPM | DIASTOLIC BLOOD PRESSURE: 84 MMHG | SYSTOLIC BLOOD PRESSURE: 138 MMHG | WEIGHT: 182 LBS | BODY MASS INDEX: 29.25 KG/M2

## 2019-06-20 PROCEDURE — 93000 ELECTROCARDIOGRAM COMPLETE: CPT

## 2019-06-20 PROCEDURE — 99214 OFFICE O/P EST MOD 30 MIN: CPT

## 2019-06-20 RX ORDER — CEPHALEXIN 500 MG/1
500 CAPSULE ORAL 3 TIMES DAILY
Qty: 9 | Refills: 0 | Status: DISCONTINUED | COMMUNITY
Start: 2017-11-07 | End: 2019-06-20

## 2019-06-20 RX ORDER — ROSUVASTATIN CALCIUM 20 MG/1
20 TABLET, FILM COATED ORAL DAILY
Qty: 30 | Refills: 5 | Status: DISCONTINUED | COMMUNITY
Start: 2018-02-08 | End: 2019-06-20

## 2019-06-20 RX ORDER — LEVOTHYROXINE SODIUM 0.12 MG/1
125 TABLET ORAL
Qty: 90 | Refills: 0 | Status: ACTIVE | COMMUNITY
Start: 2019-06-20

## 2019-08-17 ENCOUNTER — INPATIENT (INPATIENT)
Facility: HOSPITAL | Age: 79
LOS: 2 days | Discharge: HOME | DRG: 193 | End: 2019-08-20
Attending: HOSPITALIST | Admitting: HOSPITALIST
Payer: MEDICARE

## 2019-08-17 VITALS
SYSTOLIC BLOOD PRESSURE: 170 MMHG | HEART RATE: 112 BPM | DIASTOLIC BLOOD PRESSURE: 97 MMHG | OXYGEN SATURATION: 96 % | HEIGHT: 66 IN | WEIGHT: 179.9 LBS | RESPIRATION RATE: 23 BRPM

## 2019-08-17 DIAGNOSIS — R04.2 HEMOPTYSIS: ICD-10-CM

## 2019-08-17 DIAGNOSIS — Z82.49 FAMILY HISTORY OF ISCHEMIC HEART DISEASE AND OTHER DISEASES OF THE CIRCULATORY SYSTEM: Chronic | ICD-10-CM

## 2019-08-17 DIAGNOSIS — Z90.49 ACQUIRED ABSENCE OF OTHER SPECIFIED PARTS OF DIGESTIVE TRACT: Chronic | ICD-10-CM

## 2019-08-17 DIAGNOSIS — Z98.51 TUBAL LIGATION STATUS: Chronic | ICD-10-CM

## 2019-08-17 DIAGNOSIS — Z90.89 ACQUIRED ABSENCE OF OTHER ORGANS: Chronic | ICD-10-CM

## 2019-08-17 LAB
ALBUMIN SERPL ELPH-MCNC: 4.4 G/DL — SIGNIFICANT CHANGE UP (ref 3.3–5.2)
ALP SERPL-CCNC: 63 U/L — SIGNIFICANT CHANGE UP (ref 40–120)
ALT FLD-CCNC: 22 U/L — SIGNIFICANT CHANGE UP
ANION GAP SERPL CALC-SCNC: 12 MMOL/L — SIGNIFICANT CHANGE UP (ref 5–17)
APTT BLD: 35.3 SEC — SIGNIFICANT CHANGE UP (ref 27.5–36.3)
AST SERPL-CCNC: 32 U/L — HIGH
BASOPHILS # BLD AUTO: 0.05 K/UL — SIGNIFICANT CHANGE UP (ref 0–0.2)
BASOPHILS NFR BLD AUTO: 0.8 % — SIGNIFICANT CHANGE UP (ref 0–2)
BILIRUB SERPL-MCNC: 0.2 MG/DL — LOW (ref 0.4–2)
BLD GP AB SCN SERPL QL: SIGNIFICANT CHANGE UP
BUN SERPL-MCNC: 23 MG/DL — HIGH (ref 8–20)
CALCIUM SERPL-MCNC: 9.9 MG/DL — SIGNIFICANT CHANGE UP (ref 8.6–10.2)
CHLORIDE SERPL-SCNC: 103 MMOL/L — SIGNIFICANT CHANGE UP (ref 98–107)
CO2 SERPL-SCNC: 27 MMOL/L — SIGNIFICANT CHANGE UP (ref 22–29)
CREAT SERPL-MCNC: 0.93 MG/DL — SIGNIFICANT CHANGE UP (ref 0.5–1.3)
EOSINOPHIL # BLD AUTO: 0.09 K/UL — SIGNIFICANT CHANGE UP (ref 0–0.5)
EOSINOPHIL NFR BLD AUTO: 1.4 % — SIGNIFICANT CHANGE UP (ref 0–6)
GLUCOSE SERPL-MCNC: 135 MG/DL — HIGH (ref 70–115)
HCT VFR BLD CALC: 43.9 % — SIGNIFICANT CHANGE UP (ref 34.5–45)
HGB BLD-MCNC: 14.2 G/DL — SIGNIFICANT CHANGE UP (ref 11.5–15.5)
IMM GRANULOCYTES NFR BLD AUTO: 0.3 % — SIGNIFICANT CHANGE UP (ref 0–1.5)
INR BLD: 1.91 RATIO — HIGH (ref 0.88–1.16)
LYMPHOCYTES # BLD AUTO: 2.27 K/UL — SIGNIFICANT CHANGE UP (ref 1–3.3)
LYMPHOCYTES # BLD AUTO: 36.4 % — SIGNIFICANT CHANGE UP (ref 13–44)
MCHC RBC-ENTMCNC: 30.6 PG — SIGNIFICANT CHANGE UP (ref 27–34)
MCHC RBC-ENTMCNC: 32.3 GM/DL — SIGNIFICANT CHANGE UP (ref 32–36)
MCV RBC AUTO: 94.6 FL — SIGNIFICANT CHANGE UP (ref 80–100)
MONOCYTES # BLD AUTO: 0.68 K/UL — SIGNIFICANT CHANGE UP (ref 0–0.9)
MONOCYTES NFR BLD AUTO: 10.9 % — SIGNIFICANT CHANGE UP (ref 2–14)
NEUTROPHILS # BLD AUTO: 3.13 K/UL — SIGNIFICANT CHANGE UP (ref 1.8–7.4)
NEUTROPHILS NFR BLD AUTO: 50.2 % — SIGNIFICANT CHANGE UP (ref 43–77)
NT-PROBNP SERPL-SCNC: 1882 PG/ML — HIGH (ref 0–300)
PLATELET # BLD AUTO: 236 K/UL — SIGNIFICANT CHANGE UP (ref 150–400)
POTASSIUM SERPL-MCNC: 4.5 MMOL/L — SIGNIFICANT CHANGE UP (ref 3.5–5.3)
POTASSIUM SERPL-SCNC: 4.5 MMOL/L — SIGNIFICANT CHANGE UP (ref 3.5–5.3)
PROT SERPL-MCNC: 7.4 G/DL — SIGNIFICANT CHANGE UP (ref 6.6–8.7)
PROTHROM AB SERPL-ACNC: 22.4 SEC — HIGH (ref 10–12.9)
RBC # BLD: 4.64 M/UL — SIGNIFICANT CHANGE UP (ref 3.8–5.2)
RBC # FLD: 12.6 % — SIGNIFICANT CHANGE UP (ref 10.3–14.5)
SODIUM SERPL-SCNC: 142 MMOL/L — SIGNIFICANT CHANGE UP (ref 135–145)
TROPONIN T SERPL-MCNC: <0.01 NG/ML — SIGNIFICANT CHANGE UP (ref 0–0.06)
WBC # BLD: 6.24 K/UL — SIGNIFICANT CHANGE UP (ref 3.8–10.5)
WBC # FLD AUTO: 6.24 K/UL — SIGNIFICANT CHANGE UP (ref 3.8–10.5)

## 2019-08-17 PROCEDURE — 99223 1ST HOSP IP/OBS HIGH 75: CPT | Mod: AI

## 2019-08-17 PROCEDURE — 71275 CT ANGIOGRAPHY CHEST: CPT | Mod: 26

## 2019-08-17 PROCEDURE — 99291 CRITICAL CARE FIRST HOUR: CPT

## 2019-08-17 PROCEDURE — 93010 ELECTROCARDIOGRAM REPORT: CPT

## 2019-08-17 PROCEDURE — 71045 X-RAY EXAM CHEST 1 VIEW: CPT | Mod: 26

## 2019-08-17 RX ORDER — IPRATROPIUM/ALBUTEROL SULFATE 18-103MCG
3 AEROSOL WITH ADAPTER (GRAM) INHALATION ONCE
Refills: 0 | Status: COMPLETED | OUTPATIENT
Start: 2019-08-17 | End: 2019-08-17

## 2019-08-17 RX ORDER — FUROSEMIDE 40 MG
40 TABLET ORAL DAILY
Refills: 0 | Status: DISCONTINUED | OUTPATIENT
Start: 2019-08-17 | End: 2019-08-20

## 2019-08-17 RX ORDER — METOPROLOL TARTRATE 50 MG
50 TABLET ORAL
Refills: 0 | Status: DISCONTINUED | OUTPATIENT
Start: 2019-08-17 | End: 2019-08-20

## 2019-08-17 RX ORDER — ATORVASTATIN CALCIUM 80 MG/1
20 TABLET, FILM COATED ORAL AT BEDTIME
Refills: 0 | Status: DISCONTINUED | OUTPATIENT
Start: 2019-08-17 | End: 2019-08-18

## 2019-08-17 RX ORDER — ASPIRIN/CALCIUM CARB/MAGNESIUM 324 MG
81 TABLET ORAL DAILY
Refills: 0 | Status: DISCONTINUED | OUTPATIENT
Start: 2019-08-17 | End: 2019-08-20

## 2019-08-17 RX ORDER — ZALEPLON 10 MG
10 CAPSULE ORAL AT BEDTIME
Refills: 0 | Status: DISCONTINUED | OUTPATIENT
Start: 2019-08-17 | End: 2019-08-20

## 2019-08-17 RX ORDER — SACCHAROMYCES BOULARDII 250 MG
250 POWDER IN PACKET (EA) ORAL
Refills: 0 | Status: DISCONTINUED | OUTPATIENT
Start: 2019-08-17 | End: 2019-08-20

## 2019-08-17 RX ORDER — LEVOTHYROXINE SODIUM 125 MCG
125 TABLET ORAL DAILY
Refills: 0 | Status: DISCONTINUED | OUTPATIENT
Start: 2019-08-17 | End: 2019-08-20

## 2019-08-17 RX ORDER — CLONAZEPAM 1 MG
0.5 TABLET ORAL
Refills: 0 | Status: DISCONTINUED | OUTPATIENT
Start: 2019-08-17 | End: 2019-08-17

## 2019-08-17 RX ORDER — CLONAZEPAM 1 MG
0.5 TABLET ORAL
Refills: 0 | Status: DISCONTINUED | OUTPATIENT
Start: 2019-08-17 | End: 2019-08-20

## 2019-08-17 RX ORDER — FUROSEMIDE 40 MG
60 TABLET ORAL ONCE
Refills: 0 | Status: COMPLETED | OUTPATIENT
Start: 2019-08-17 | End: 2019-08-17

## 2019-08-17 RX ORDER — BACITRACIN ZINC 500 UNIT/G
1 OINTMENT IN PACKET (EA) TOPICAL DAILY
Refills: 0 | Status: DISCONTINUED | OUTPATIENT
Start: 2019-08-17 | End: 2019-08-20

## 2019-08-17 RX ADMIN — Medication 0.5 MILLIGRAM(S): at 17:45

## 2019-08-17 RX ADMIN — Medication 250 MILLIGRAM(S): at 17:45

## 2019-08-17 RX ADMIN — Medication 50 MILLIGRAM(S): at 17:45

## 2019-08-17 RX ADMIN — Medication 3 MILLILITER(S): at 08:05

## 2019-08-17 RX ADMIN — Medication 60 MILLIGRAM(S): at 08:00

## 2019-08-17 RX ADMIN — Medication 1 APPLICATION(S): at 17:45

## 2019-08-17 NOTE — ED PROVIDER NOTE - CLINICAL SUMMARY MEDICAL DECISION MAKING FREE TEXT BOX
78 y/o female w/PMH Afib on xeralto presents with hemoptysis x 1 day, consider mechanical irritation vs PE, will ct angio chest, aba, coags, basic labs - reassess

## 2019-08-17 NOTE — ED ADULT TRIAGE NOTE - CHIEF COMPLAINT QUOTE
pt arrive by ambulance on CPAP with reports of severe respiratory distress, woke up with bright red frothy sputum, SpO2 in 80's on RA.

## 2019-08-17 NOTE — H&P ADULT - NSICDXPASTSURGICALHX_GEN_ALL_CORE_FT
PAST SURGICAL HISTORY:  Family history of PTCA with 2 stents    H/O tubal ligation     S/P appendectomy     S/P tonsillectomy

## 2019-08-17 NOTE — ED ADULT NURSE NOTE - OBJECTIVE STATEMENT
assumed pt care as critical care RN.  Pt arrives on CPAP machine via EMS.  CPAP discontinued and pt placed in 2l O2 via NC.  Oxygen saturation 98%.  Pt reports waking up this morning with right sided throat pain and productive cough with bright red blood.  Pt continues to cough up bright red blood.  Pt had bronchitis that responded well to antibiotics one month ago. Per Dr. Burks, no need for mask, no suspicion of TB. Pt also reports some rectal bleeding which she states is from hemorrhoids.  Small amount of bright red blood on urine pad.

## 2019-08-17 NOTE — ED PROVIDER NOTE - NS ED ROS FT
Constitutional: endorses diaphoresis, denies chills, fever  HENT: denies headaches  Eyes: denies blurred vision, double vision  Cardiovascular: denies chest pain, claudication, leg swelling, orthopnea, palpitations, paroxysmal nocturnal dyspnea  Respiratory: endorses cough, hemoptysis, shortness of breath, wheezing, denies dyspnea on exertion, sputum production  Gastrointestinal: denies abdominal pain, hematochezia, constipation, diarrhea, heartburn, nausea, vomiting  Genitourinary: denies dysuria, flank pain, frequency, urgency, hematuria  Musculoskeletal: denies back pain, falls  Endocrine: denies heat intolerance, cold intolerance, polydipsia, polyuria  Hematologic: denies easy bruising, easy bleeding  Neurologic: denies dizziness, focal weakness

## 2019-08-17 NOTE — ED PROVIDER NOTE - OBJECTIVE STATEMENT
Pt is a 80 y/o female w/PMH afip on Xarelto, HTN, HLD presents with coughing up blood x 1 day.  Pt states that when she woke up this morning she felt wheezy and a little shortness of breath.  This then developed into a cough which produced bright red blood.  This is the first time the pt has had hemoptysis and otherwise has no complaints.  No recent travel, s/p ABx course for bronchitis 3 weeks ago.  Pt was noted to be in afib on arrival to the ed.  per EMS was O2 sat was 88% on RA, improved to 100% on CPAP, currently O2 sat @ 96% RA

## 2019-08-17 NOTE — H&P ADULT - NSHPPHYSICALEXAM_GEN_ALL_CORE
Vital Signs Last 24 Hrs  T(C): 37.7 (17 Aug 2019 08:02), Max: 37.7 (17 Aug 2019 08:02)  T(F): 99.8 (17 Aug 2019 08:02), Max: 99.8 (17 Aug 2019 08:02)  HR: 80 (17 Aug 2019 11:11) (80 - 112)  BP: 141/85 (17 Aug 2019 11:11) (141/85 - 170/97)  BP(mean): --  RR: 19 (17 Aug 2019 11:11) (16 - 23)  SpO2: 96% (17 Aug 2019 11:11) (96% - 98%)    General appearance: No acute distress, Awake, Alert  HEENT: Normocephalic, Atraumatic, Conjunctiva clear, EOMI  Neck: Supple, No JVD, No tenderness  Lungs: Breath sound equal bilaterally, No wheezes, No rales  Cardiovascular: S1S2, Regular rhythm  Abdomen: Soft, Nontender, Nondistended, No guarding/rebound, Positive bowel sounds  Extremities: No clubbing, No cyanosis, No edema, No calf tenderness  Neuro: Strength equal bilaterally, No tremors  Psychiatric: Appropriate mood, Normal affect

## 2019-08-17 NOTE — ED PROVIDER NOTE - CARE PLAN
Principal Discharge DX:	Hemoptysis  Secondary Diagnosis:	Pneumonia of right upper lobe due to infectious organism

## 2019-08-17 NOTE — ED ADULT NURSE REASSESSMENT NOTE - NS ED NURSE REASSESS COMMENT FT1
Pt received from critical care nurse, pt A&OX3, states she feels she's breathing easier.  CM in place, Pt received from critical care nurse, pt A&OX3, states she feels she's breathing easier.  Pt placed on bedpan as requested, pt started having hemo[ptysis again, scant amount of serosanguinous drainage from rectum.  CM in place, O2 sat drops to 94% on RA, pt maitained on 2LNC.  Family at bedside.  Will continue to monitor.

## 2019-08-17 NOTE — ED PROVIDER NOTE - PHYSICAL EXAMINATION
Constitutional: well-nourished female, on cpap, sitting on stretcher with bloody tissue.  HEENT: normocephalic, atraumatic, conjunctiva pink without scleral icterus, EOMI.  Neck supple, no masses, thyroid not palpable, trachea midline, no LAD  Cardiovascular: RRR, S1/S2 present, no MRG.  No JVD, peripheral pulses 2+, cap refill <2 sec  Respiratory: CTAB, no increased work of breathing, +rales all lung fields , no wheezes, rhonchi.  Abdomen: Bowel sounds present, soft, NT/ ND.  No rebound, guarding.  MSK: b/l 2+ edema, no cyanosis, clubbing.  Skin: warm, dry, no lesions noted  Neuro: CN 2-12 grossly intact, no focal deficits.  Psych: Alert and oriented to self, place, time

## 2019-08-17 NOTE — H&P ADULT - NSICDXPASTMEDICALHX_GEN_ALL_CORE_FT
PAST MEDICAL HISTORY:  Afib     Essential hypertension     High cholesterol     Hypothyroid     Myocardial infarction has 2 stents    Stented coronary artery

## 2019-08-17 NOTE — ED PROVIDER NOTE - ATTENDING CONTRIBUTION TO CARE
I personally saw the patient with the resident, and completed the key components of the history and physical exam. I then discussed the management plan with the resident.    80 y/o F with PMH A douglas (on Xarelto) presents in respiratory distress with hemoptysis x 1 day upon waking up today, satting 85% on room air per EMS, improved to 100% on CPAP by EMS, satting 98% on RA in the ED after lasix and duo nebs. Patient is 3 weeks s/p completing a course of ABx for bronchitis. She also notes bleeding hemorrhoids.     Exam: Mild respiratory distress, coughing up small (quarter size) amounts of bright red blood, bright red blood in posterior oropharnyx, mild epistaxis, rales in all lung fields, speaking in full sentences, HR irregularly irregular rate and rhythm, abd soft, NT/ND, no LE edema, 2+ symmetrical distal pulses.    Will continue with duo-nebs, 60 mg IV lasix given in the ED. Differential includes, but not limited to, bronchitis vs. PE vs. PNA. No BiPAP at this time due to hemoptysis.

## 2019-08-17 NOTE — ED ADULT NURSE NOTE - INTERVENTIONS DEFINITIONS
Stretcher in lowest position, wheels locked, appropriate side rails in place/Non-slip footwear when patient is off stretcher/Provide visual clues: red socks

## 2019-08-17 NOTE — H&P ADULT - HISTORY OF PRESENT ILLNESS
79F presented with complaints of cough productive of blood tinges sputum. The patient reported that she was previously treated with oral antibiotics for bronchitis. At that time, she had a productive cough consisting of greenish sputum but no evidence of blood. After completing the course of antibiotics, the cough resolved but the patient had intermittent wheezing. She denied any recent fevers or chills at home. She had no sick contacts to her knowledge. She denied any history of hemoptysis in the past and is unaware of any aggravating or relieving factors. She noted being on rivaroxaban for atrial fibrillation without history of bleeding in the past.

## 2019-08-17 NOTE — ED ADULT NURSE NOTE - PMH
Afib    Essential hypertension    High cholesterol    Hypothyroid    Myocardial infarction  has 2 stents  Stented coronary artery

## 2019-08-18 LAB
ANION GAP SERPL CALC-SCNC: 16 MMOL/L — SIGNIFICANT CHANGE UP (ref 5–17)
BUN SERPL-MCNC: 26 MG/DL — HIGH (ref 8–20)
CALCIUM SERPL-MCNC: 10.1 MG/DL — SIGNIFICANT CHANGE UP (ref 8.6–10.2)
CHLORIDE SERPL-SCNC: 98 MMOL/L — SIGNIFICANT CHANGE UP (ref 98–107)
CO2 SERPL-SCNC: 29 MMOL/L — SIGNIFICANT CHANGE UP (ref 22–29)
CREAT SERPL-MCNC: 1.11 MG/DL — SIGNIFICANT CHANGE UP (ref 0.5–1.3)
GLUCOSE SERPL-MCNC: 106 MG/DL — SIGNIFICANT CHANGE UP (ref 70–115)
HCT VFR BLD CALC: 46.6 % — HIGH (ref 34.5–45)
HGB BLD-MCNC: 15 G/DL — SIGNIFICANT CHANGE UP (ref 11.5–15.5)
MCHC RBC-ENTMCNC: 30.2 PG — SIGNIFICANT CHANGE UP (ref 27–34)
MCHC RBC-ENTMCNC: 32.2 GM/DL — SIGNIFICANT CHANGE UP (ref 32–36)
MCV RBC AUTO: 93.8 FL — SIGNIFICANT CHANGE UP (ref 80–100)
PLATELET # BLD AUTO: 204 K/UL — SIGNIFICANT CHANGE UP (ref 150–400)
POTASSIUM SERPL-MCNC: 3.6 MMOL/L — SIGNIFICANT CHANGE UP (ref 3.5–5.3)
POTASSIUM SERPL-SCNC: 3.6 MMOL/L — SIGNIFICANT CHANGE UP (ref 3.5–5.3)
RBC # BLD: 4.97 M/UL — SIGNIFICANT CHANGE UP (ref 3.8–5.2)
RBC # FLD: 12.6 % — SIGNIFICANT CHANGE UP (ref 10.3–14.5)
SODIUM SERPL-SCNC: 143 MMOL/L — SIGNIFICANT CHANGE UP (ref 135–145)
WBC # BLD: 8.63 K/UL — SIGNIFICANT CHANGE UP (ref 3.8–10.5)
WBC # FLD AUTO: 8.63 K/UL — SIGNIFICANT CHANGE UP (ref 3.8–10.5)

## 2019-08-18 PROCEDURE — 99232 SBSQ HOSP IP/OBS MODERATE 35: CPT

## 2019-08-18 RX ORDER — LEVOTHYROXINE SODIUM 125 MCG
1 TABLET ORAL
Qty: 0 | Refills: 0 | DISCHARGE

## 2019-08-18 RX ORDER — BENZOCAINE AND MENTHOL 5; 1 G/100ML; G/100ML
1 LIQUID ORAL EVERY 4 HOURS
Refills: 0 | Status: DISCONTINUED | OUTPATIENT
Start: 2019-08-18 | End: 2019-08-20

## 2019-08-18 RX ORDER — SENNA PLUS 8.6 MG/1
1 TABLET ORAL DAILY
Refills: 0 | Status: DISCONTINUED | OUTPATIENT
Start: 2019-08-18 | End: 2019-08-20

## 2019-08-18 RX ORDER — CLONAZEPAM 1 MG
0 TABLET ORAL
Qty: 0 | Refills: 0 | DISCHARGE

## 2019-08-18 RX ADMIN — Medication 125 MICROGRAM(S): at 06:31

## 2019-08-18 RX ADMIN — Medication 250 MILLIGRAM(S): at 19:01

## 2019-08-18 RX ADMIN — Medication 40 MILLIGRAM(S): at 06:31

## 2019-08-18 RX ADMIN — Medication 81 MILLIGRAM(S): at 12:16

## 2019-08-18 RX ADMIN — Medication 1 APPLICATION(S): at 15:30

## 2019-08-18 RX ADMIN — Medication 0.5 MILLIGRAM(S): at 06:26

## 2019-08-18 RX ADMIN — Medication 250 MILLIGRAM(S): at 06:31

## 2019-08-18 RX ADMIN — Medication 50 MILLIGRAM(S): at 19:01

## 2019-08-18 RX ADMIN — Medication 50 MILLIGRAM(S): at 06:33

## 2019-08-18 RX ADMIN — Medication 10 MILLIGRAM(S): at 22:05

## 2019-08-18 RX ADMIN — BENZOCAINE AND MENTHOL 1 LOZENGE: 5; 1 LIQUID ORAL at 04:16

## 2019-08-18 RX ADMIN — SENNA PLUS 1 TABLET(S): 8.6 TABLET ORAL at 15:29

## 2019-08-18 NOTE — PROGRESS NOTE ADULT - SUBJECTIVE AND OBJECTIVE BOX
HEBERT MARTINEZ  ----------------------------------------  The patient was seen and evaluated for hemoptysis.  The patient is in no acute distress.  Denied any chest pain, palpitations, dyspnea, or abdominal pain.  Reports no further cough or hemoptysis. Noted sore throat.    Vital Signs Last 24 Hrs  T(C): 36.4 (18 Aug 2019 06:27), Max: 36.9 (17 Aug 2019 16:55)  T(F): 97.6 (18 Aug 2019 06:27), Max: 98.4 (17 Aug 2019 16:55)  HR: 84 (18 Aug 2019 06:27) (84 - 100)  BP: 110/70 (18 Aug 2019 06:27) (110/64 - 110/75)  BP(mean): --  RR: 18 (18 Aug 2019 06:27) (18 - 20)  SpO2: 96% (18 Aug 2019 06:27) (96% - 100%)    PHYSICAL EXAMINATION:  ----------------------------------------  General appearance: No acute distress, Awake, Alert  HEENT: Normocephalic, Atraumatic, Conjunctiva clear, EOMI  Neck: Supple, No JVD, No tenderness  Lungs: Breath sound equal bilaterally, No wheezes, No rales  Cardiovascular: S1S2, Regular rhythm  Abdomen: Soft, Nontender, Nondistended, No guarding/rebound, Positive bowel sounds  Extremities: No clubbing, No cyanosis, No edema, No calf tenderness  Neuro: Strength equal bilaterally, No tremors  Psychiatric: Appropriate mood, Normal affect    LABORATORY STUDIES:  ----------------------------------------             15.0   8.63  )-----------( 204      ( 18 Aug 2019 06:35 )             46.6     08-18    143  |  98  |  26.0<H>  ----------------------------<  106  3.6   |  29.0  |  1.11    Ca    10.1      18 Aug 2019 06:35    TPro  7.4  /  Alb  4.4  /  TBili  0.2<L>  /  DBili  x   /  AST  32<H>  /  ALT  22  /  AlkPhos  63  08-17    LIVER FUNCTIONS - ( 17 Aug 2019 08:17 )  Alb: 4.4 g/dL / Pro: 7.4 g/dL / ALK PHOS: 63 U/L / ALT: 22 U/L / AST: 32 U/L / GGT: x           PT/INR - ( 17 Aug 2019 08:17 )   PT: 22.4 sec;   INR: 1.91 ratio    PTT - ( 17 Aug 2019 08:17 )  PTT:35.3 sec    CARDIAC MARKERS ( 17 Aug 2019 08:17 )  x     / <0.01 ng/mL / x     / x     / x        MEDICATIONS  (STANDING):  aspirin enteric coated 81 milliGRAM(s) Oral daily  atorvastatin 20 milliGRAM(s) Oral at bedtime  BACItracin   Ointment 1 Application(s) Topical daily  clonazePAM  Tablet 0.5 milliGRAM(s) Oral two times a day  furosemide    Tablet 40 milliGRAM(s) Oral daily  levoFLOXacin IVPB      levoFLOXacin IVPB 750 milliGRAM(s) IV Intermittent every 24 hours  levothyroxine 125 MICROGram(s) Oral daily  metoprolol tartrate 50 milliGRAM(s) Oral two times a day  saccharomyces boulardii 250 milliGRAM(s) Oral two times a day  senna 1 Tablet(s) Oral daily    MEDICATIONS  (PRN):  benzocaine 15 mG/menthol 3.6 mG Lozenge 1 Lozenge Oral every 4 hours PRN Sore Throat  zaleplon 10 milliGRAM(s) Oral at bedtime PRN Insomnia      ASSESSMENT / PLAN:  ----------------------------------------  79F with a history of atrial fibrillation on rivaroxaban, hypothyroidism, and coronary artery disease who presented with cough and hemoptysis. CT angiogram of the chest was without evidence of pulmonary embolism but did note an infiltrate in right lung.    Hemoptysis / Pneumonia - On levofloxacin. No hypoxia noted. No further hemoptysis noted. CT results discussed with the patient including the left breast nodule. She reported a history of a right sided breast nodule in the past which was noted to be benign after biopsy. Recommendations were given to pursue further investigation on an outpatient basis.    CAD - On aspirin and statin therapy. No chest pain.    Atrial fibrillation - On metoprolol for rate control. Rivaroxaban to be restarted and the patient monitored for any bleeding.    Hypothyroidism - On levothyroxine.    Anxiety / Insomnia - On clonazepam as needed.

## 2019-08-19 PROCEDURE — 99232 SBSQ HOSP IP/OBS MODERATE 35: CPT

## 2019-08-19 RX ORDER — DIPHENHYDRAMINE HCL 50 MG
50 CAPSULE ORAL ONCE
Refills: 0 | Status: COMPLETED | OUTPATIENT
Start: 2019-08-19 | End: 2019-08-19

## 2019-08-19 RX ORDER — RIVAROXABAN 15 MG-20MG
15 KIT ORAL
Refills: 0 | Status: DISCONTINUED | OUTPATIENT
Start: 2019-08-19 | End: 2019-08-20

## 2019-08-19 RX ADMIN — Medication 250 MILLIGRAM(S): at 05:51

## 2019-08-19 RX ADMIN — Medication 10 MILLIGRAM(S): at 23:12

## 2019-08-19 RX ADMIN — Medication 81 MILLIGRAM(S): at 11:28

## 2019-08-19 RX ADMIN — Medication 50 MILLIGRAM(S): at 17:32

## 2019-08-19 RX ADMIN — Medication 125 MICROGRAM(S): at 05:51

## 2019-08-19 RX ADMIN — RIVAROXABAN 15 MILLIGRAM(S): KIT at 17:32

## 2019-08-19 RX ADMIN — Medication 50 MILLIGRAM(S): at 13:33

## 2019-08-19 RX ADMIN — Medication 1 APPLICATION(S): at 12:29

## 2019-08-19 RX ADMIN — SENNA PLUS 1 TABLET(S): 8.6 TABLET ORAL at 11:28

## 2019-08-19 RX ADMIN — Medication 50 MILLIGRAM(S): at 05:51

## 2019-08-19 RX ADMIN — Medication 40 MILLIGRAM(S): at 05:51

## 2019-08-19 RX ADMIN — Medication 250 MILLIGRAM(S): at 17:32

## 2019-08-19 NOTE — CDI QUERY NOTE - NSCDIOTHERTXTBX_GEN_ALL_CORE_HH
Pt. admitted with SOB, P 112, R 23.  BNP 1882.  CXR- mild interstitial edema    ED- Respiratory: CTAB, no increased work of breathing, +rales all lung fields.    MSK: b/l 2+ edema    H&P- Labs, Radiology, Cardiology, and Other Results: Chest Xray was reviewed, mild pulmonary vascular congestion    Treatment with Lasix 60 mg IV, Lasix 40 mg po daily.    Is there a diagnosis associated with above findings?    1) High BNP due to acute CHF ( specify type if known)  2) Acute pulmonary edema  3) Other  4) Not clinically significant

## 2019-08-19 NOTE — CDI QUERY NOTE - NSCDINOTEQUERY_GEN_A_CORE
Allergy injection given thru Immunotherapy record. Refer to XIS PRO.         Vial 1B 1:1 (RED)    Set 1/2    C APD DM W     0.5ml     URA SC           Vial 1B 1:1   RED           2/2          TR GR         0.5ml      SUMMER  SC        
High BNP

## 2019-08-19 NOTE — PROGRESS NOTE ADULT - SUBJECTIVE AND OBJECTIVE BOX
HEBERT MARTINEZ  ----------------------------------------  The patient was seen and evaluated for pneumonia.  The patient is in no acute distress.  Denied any chest pain, palpitations, dyspnea, or abdominal pain.  Reported an episode of cough yesterday with minimal blood tinged sputum. No dyspnea. Sore throat improved.    Vital Signs Last 24 Hrs  T(C): 37 (19 Aug 2019 10:13), Max: 37 (19 Aug 2019 10:13)  T(F): 98.6 (19 Aug 2019 10:13), Max: 98.6 (19 Aug 2019 10:13)  HR: 83 (19 Aug 2019 10:13) (76 - 100)  BP: 108/75 (19 Aug 2019 10:13) (107/90 - 138/73)  BP(mean): --  RR: 19 (19 Aug 2019 10:13) (18 - 19)  SpO2: 100% (19 Aug 2019 10:13) (100% - 100%)    PHYSICAL EXAMINATION:  ----------------------------------------  General appearance: No acute distress, Awake, Alert  HEENT: Normocephalic, Atraumatic, Conjunctiva clear, EOMI  Neck: Supple, No JVD, No tenderness  Lungs: Breath sound equal bilaterally, No wheezes, No rales  Cardiovascular: S1S2, Regular rhythm  Abdomen: Soft, Nontender, Nondistended, No guarding/rebound, Positive bowel sounds  Extremities: No clubbing, No cyanosis, No edema, No calf tenderness  Neuro: Strength equal bilaterally, No tremors  Psychiatric: Appropriate mood, Normal affect    LABORATORY STUDIES:  ----------------------------------------             15.0   8.63  )-----------( 204      ( 18 Aug 2019 06:35 )             46.6     08-18    143  |  98  |  26.0<H>  ----------------------------<  106  3.6   |  29.0  |  1.11    Ca    10.1      18 Aug 2019 06:35    MEDICATIONS  (STANDING):  aspirin enteric coated 81 milliGRAM(s) Oral daily  BACItracin   Ointment 1 Application(s) Topical daily  clonazePAM  Tablet 0.5 milliGRAM(s) Oral two times a day  furosemide    Tablet 40 milliGRAM(s) Oral daily  levoFLOXacin  Tablet 750 milliGRAM(s) Oral every 24 hours  levoFLOXacin IVPB      levoFLOXacin IVPB 750 milliGRAM(s) IV Intermittent every 24 hours  levothyroxine 125 MICROGram(s) Oral daily  metoprolol tartrate 50 milliGRAM(s) Oral two times a day  rivaroxaban 15 milliGRAM(s) Oral with dinner  saccharomyces boulardii 250 milliGRAM(s) Oral two times a day  senna 1 Tablet(s) Oral daily    MEDICATIONS  (PRN):  benzocaine 15 mG/menthol 3.6 mG Lozenge 1 Lozenge Oral every 4 hours PRN Sore Throat  diphenhydrAMINE   Injectable 50 milliGRAM(s) IV Push once PRN Rash and/or Itching  zaleplon 10 milliGRAM(s) Oral at bedtime PRN Insomnia      ASSESSMENT / PLAN:  ----------------------------------------  79F with a history of atrial fibrillation on rivaroxaban, hypothyroidism, and coronary artery disease who presented with cough and hemoptysis. CT angiogram of the chest was without evidence of pulmonary embolism but did note an infiltrate in right lung. Antibiotics were initiated for pneumonia.    Hemoptysis / Pneumonia - On levofloxacin. The patient had swelling and pruritis a the left hand and intravenous antibiotics were discontinued. No other areas affected, no rash, no dyspnea or wheeze. Suspect a local reaction rather than allergic reaction to levofloxacin as the patient has tolerated the antibiotic in the past. To transition to oral formulation to complete treatment.    Breast nodule - CT findings discussed with the patient yesterday. She is now more accepting of further follow up and reports that she will seek examination.    CAD - On aspirin and statin therapy. No chest pain.    Atrial fibrillation - On metoprolol for rate control. Rivaroxaban to be restarted and the patient monitored for any further bleeding.    Hypothyroidism - On levothyroxine.    Anxiety / Insomnia - On clonazepam as needed. HEBERT MARTINEZ  ----------------------------------------  The patient was seen and evaluated for pneumonia.  The patient is in no acute distress.  Denied any chest pain, palpitations, dyspnea, or abdominal pain.  Reported an episode of cough yesterday with minimal blood tinged sputum. No dyspnea. Sore throat improved.    Vital Signs Last 24 Hrs  T(C): 37 (19 Aug 2019 10:13), Max: 37 (19 Aug 2019 10:13)  T(F): 98.6 (19 Aug 2019 10:13), Max: 98.6 (19 Aug 2019 10:13)  HR: 83 (19 Aug 2019 10:13) (76 - 100)  BP: 108/75 (19 Aug 2019 10:13) (107/90 - 138/73)  BP(mean): --  RR: 19 (19 Aug 2019 10:13) (18 - 19)  SpO2: 100% (19 Aug 2019 10:13) (100% - 100%)    PHYSICAL EXAMINATION:  ----------------------------------------  General appearance: No acute distress, Awake, Alert  HEENT: Normocephalic, Atraumatic, Conjunctiva clear, EOMI  Neck: Supple, No JVD, No tenderness  Lungs: Breath sound equal bilaterally, No wheezes, No rales  Cardiovascular: S1S2, Regular rhythm  Abdomen: Soft, Nontender, Nondistended, No guarding/rebound, Positive bowel sounds  Extremities: No clubbing, No cyanosis, No edema, No calf tenderness  Neuro: Strength equal bilaterally, No tremors  Psychiatric: Appropriate mood, Normal affect    LABORATORY STUDIES:  ----------------------------------------             15.0   8.63  )-----------( 204      ( 18 Aug 2019 06:35 )             46.6     08-18    143  |  98  |  26.0<H>  ----------------------------<  106  3.6   |  29.0  |  1.11    Ca    10.1      18 Aug 2019 06:35    MEDICATIONS  (STANDING):  aspirin enteric coated 81 milliGRAM(s) Oral daily  BACItracin   Ointment 1 Application(s) Topical daily  clonazePAM  Tablet 0.5 milliGRAM(s) Oral two times a day  furosemide    Tablet 40 milliGRAM(s) Oral daily  levoFLOXacin  Tablet 750 milliGRAM(s) Oral every 24 hours  levoFLOXacin IVPB      levoFLOXacin IVPB 750 milliGRAM(s) IV Intermittent every 24 hours  levothyroxine 125 MICROGram(s) Oral daily  metoprolol tartrate 50 milliGRAM(s) Oral two times a day  rivaroxaban 15 milliGRAM(s) Oral with dinner  saccharomyces boulardii 250 milliGRAM(s) Oral two times a day  senna 1 Tablet(s) Oral daily    MEDICATIONS  (PRN):  benzocaine 15 mG/menthol 3.6 mG Lozenge 1 Lozenge Oral every 4 hours PRN Sore Throat  diphenhydrAMINE   Injectable 50 milliGRAM(s) IV Push once PRN Rash and/or Itching  zaleplon 10 milliGRAM(s) Oral at bedtime PRN Insomnia      ASSESSMENT / PLAN:  ----------------------------------------  79F with a history of atrial fibrillation on rivaroxaban, hypothyroidism, and coronary artery disease who presented with cough and hemoptysis. CT angiogram of the chest was without evidence of pulmonary embolism but did note an infiltrate in right lung. Antibiotics were initiated for pneumonia.    Hemoptysis / Pneumonia - On levofloxacin. The patient had swelling and pruritis a the left hand and intravenous antibiotics were discontinued. No other areas affected, no rash, no dyspnea or wheeze. Suspect a local reaction rather than allergic reaction to levofloxacin as the patient has tolerated the antibiotic in the past. To transition to oral formulation to complete treatment.    Breast nodule - CT findings discussed with the patient yesterday. She is now more accepting of further follow up and reports that she will seek examination.    CAD - On aspirin and statin therapy. No chest pain.    Atrial fibrillation - On metoprolol for rate control. Rivaroxaban to be restarted and the patient monitored for any further bleeding.    Hypothyroidism - On levothyroxine.    Anxiety / Insomnia - On clonazepam as needed.    Acute pulmonary edema - Resolved after administration of furosemide in the emergency department on admission.

## 2019-08-20 ENCOUNTER — TRANSCRIPTION ENCOUNTER (OUTPATIENT)
Age: 79
End: 2019-08-20

## 2019-08-20 VITALS
HEART RATE: 88 BPM | OXYGEN SATURATION: 95 % | DIASTOLIC BLOOD PRESSURE: 80 MMHG | RESPIRATION RATE: 16 BRPM | TEMPERATURE: 98 F | SYSTOLIC BLOOD PRESSURE: 125 MMHG

## 2019-08-20 PROCEDURE — 99239 HOSP IP/OBS DSCHRG MGMT >30: CPT

## 2019-08-20 RX ORDER — CIPROFLOXACIN LACTATE 400MG/40ML
1 VIAL (ML) INTRAVENOUS
Qty: 3 | Refills: 0
Start: 2019-08-20 | End: 2019-08-22

## 2019-08-20 RX ORDER — ASPIRIN/CALCIUM CARB/MAGNESIUM 324 MG
1 TABLET ORAL
Qty: 0 | Refills: 0 | DISCHARGE
Start: 2019-08-20

## 2019-08-20 RX ORDER — SACCHAROMYCES BOULARDII 250 MG
1 POWDER IN PACKET (EA) ORAL
Qty: 8 | Refills: 0
Start: 2019-08-20 | End: 2019-08-23

## 2019-08-20 RX ADMIN — Medication 250 MILLIGRAM(S): at 05:24

## 2019-08-20 RX ADMIN — Medication 81 MILLIGRAM(S): at 11:39

## 2019-08-20 RX ADMIN — Medication 125 MICROGRAM(S): at 05:24

## 2019-08-20 RX ADMIN — Medication 40 MILLIGRAM(S): at 05:24

## 2019-08-20 RX ADMIN — Medication 50 MILLIGRAM(S): at 05:24

## 2019-08-20 RX ADMIN — Medication 250 MILLIGRAM(S): at 16:25

## 2019-08-20 RX ADMIN — Medication 50 MILLIGRAM(S): at 16:25

## 2019-08-20 RX ADMIN — SENNA PLUS 1 TABLET(S): 8.6 TABLET ORAL at 11:39

## 2019-08-20 RX ADMIN — Medication 1 APPLICATION(S): at 11:39

## 2019-08-20 NOTE — DISCHARGE NOTE PROVIDER - PROVIDER TOKENS
PROVIDER:[TOKEN:[[id number]],FOLLOWUP:[1 week]],PROVIDER:[TOKEN:[[id number]],FOLLOWUP:[2 weeks]]

## 2019-08-20 NOTE — PROGRESS NOTE ADULT - ATTENDING COMMENTS
Estimate date of discharge 8/20/19
Estimate date of discharge 8/18/19
Estimate date of discharge 8/20/19

## 2019-08-20 NOTE — DISCHARGE NOTE PROVIDER - CARE PROVIDER_API CALL
Lombardi, Adrian C (MD)  Family Medicine  [address]  [place] [address]  Phone: [phone]  Fax: [phone]  Follow Up Time: 1 week    Dion Franco)  Cardiovascular Disease; Interventional Cardiology  [address], [address]  [place] [address]  Phone: [phone]  Fax: [phone]  Follow Up Time: 2 weeks

## 2019-08-20 NOTE — DISCHARGE NOTE PROVIDER - NSDCCPCAREPLAN_GEN_ALL_CORE_FT
PRINCIPAL DISCHARGE DIAGNOSIS  Diagnosis: Pneumonia of right upper lobe due to infectious organism  Assessment and Plan of Treatment: Complete the course of antibiotics and follow up with your primary care physician for further management.      SECONDARY DISCHARGE DIAGNOSES  Diagnosis: Atrial fibrillation  Assessment and Plan of Treatment: Continue on rivaroxaban and follow up with your cardiologist for further management.

## 2019-08-20 NOTE — DISCHARGE NOTE NURSING/CASE MANAGEMENT/SOCIAL WORK - NSDCDPATPORTLINK_GEN_ALL_CORE
You can access the [facility] Patient Portal, offered by [facility], by registering with the following website: [email or web address]

## 2019-08-20 NOTE — DISCHARGE NOTE PROVIDER - CARE PROVIDERS DIRECT ADDRESSES
,DirectAddress_Unknown,[email or web address]

## 2019-08-20 NOTE — PROGRESS NOTE ADULT - SUBJECTIVE AND OBJECTIVE BOX
HEBERT MARTINEZ  ----------------------------------------  The patient was seen and evaluated for hemoptysis.  The patient is in no acute distress.  Denied any chest pain, palpitations, dyspnea, or abdominal pain.  Offers no complaints. No further coughing or hemoptysis. Sore throat improved.    Vital Signs Last 24 Hrs  T(C): 36.7 (20 Aug 2019 05:17), Max: 37 (19 Aug 2019 10:13)  T(F): 98.1 (20 Aug 2019 05:17), Max: 98.6 (19 Aug 2019 10:13)  HR: 89 (20 Aug 2019 05:17) (80 - 89)  BP: 112/70 (20 Aug 2019 05:17) (108/75 - 115/75)  BP(mean): --  RR: 16 (20 Aug 2019 05:17) (16 - 19)  SpO2: 98% (20 Aug 2019 05:17) (96% - 100%)    PHYSICAL EXAMINATION:  ----------------------------------------  General appearance: No acute distress, Awake, Alert  HEENT: Normocephalic, Atraumatic, Conjunctiva clear, EOMI  Neck: Supple, No JVD, No tenderness  Lungs: Breath sound equal bilaterally, No wheezes, No rales  Cardiovascular: S1S2, Regular rhythm  Abdomen: Soft, Nontender, Nondistended, No guarding/rebound, Positive bowel sounds  Extremities: No clubbing, No cyanosis, No edema, No calf tenderness  Neuro: Strength equal bilaterally, No tremors  Psychiatric: Appropriate mood, Normal affect    MEDICATIONS  (STANDING):  aspirin enteric coated 81 milliGRAM(s) Oral daily  BACItracin   Ointment 1 Application(s) Topical daily  clonazePAM  Tablet 0.5 milliGRAM(s) Oral two times a day  furosemide    Tablet 40 milliGRAM(s) Oral daily  levoFLOXacin  Tablet 750 milliGRAM(s) Oral every 24 hours  levothyroxine 125 MICROGram(s) Oral daily  metoprolol tartrate 50 milliGRAM(s) Oral two times a day  rivaroxaban 15 milliGRAM(s) Oral with dinner  saccharomyces boulardii 250 milliGRAM(s) Oral two times a day  senna 1 Tablet(s) Oral daily    MEDICATIONS  (PRN):  benzocaine 15 mG/menthol 3.6 mG Lozenge 1 Lozenge Oral every 4 hours PRN Sore Throat  zaleplon 10 milliGRAM(s) Oral at bedtime PRN Insomnia      ASSESSMENT / PLAN:  ----------------------------------------  79F with a history of atrial fibrillation on rivaroxaban, hypothyroidism, and coronary artery disease who presented with cough and hemoptysis. CT angiogram of the chest was without evidence of pulmonary embolism but did note an infiltrate in right lung. Antibiotics were initiated for pneumonia. Initially rivaroxaban was hel and the hemoptysis resolved.    Hemoptysis / Pneumonia - On levofloxacin. No reaction noted. Left hand swelling resolved.    Breast nodule - The patient reports plans to pursue further workup for the nodule on an outpatient basis.    CAD - On aspirin and statin therapy. No chest pain.    Atrial fibrillation - On metoprolol for rate control. Rivaroxaban restarted yesterday.    Hypothyroidism - On levothyroxine.    Anxiety / Insomnia - On clonazepam as needed.    Acute pulmonary edema - On presentation and resolved after administration of furosemide while in the emergency department.

## 2019-08-30 PROBLEM — Z95.5 PRESENCE OF CORONARY ANGIOPLASTY IMPLANT AND GRAFT: Chronic | Status: ACTIVE | Noted: 2019-08-17

## 2019-08-30 PROBLEM — E03.9 HYPOTHYROIDISM, UNSPECIFIED: Chronic | Status: ACTIVE | Noted: 2019-08-17

## 2019-09-19 ENCOUNTER — NON-APPOINTMENT (OUTPATIENT)
Age: 79
End: 2019-09-19

## 2019-09-19 ENCOUNTER — APPOINTMENT (OUTPATIENT)
Dept: CARDIOLOGY | Facility: CLINIC | Age: 79
End: 2019-09-19
Payer: MEDICARE

## 2019-09-19 VITALS
BODY MASS INDEX: 28.93 KG/M2 | DIASTOLIC BLOOD PRESSURE: 77 MMHG | HEART RATE: 88 BPM | HEIGHT: 66 IN | WEIGHT: 180 LBS | SYSTOLIC BLOOD PRESSURE: 117 MMHG | OXYGEN SATURATION: 97 %

## 2019-09-19 PROCEDURE — 99215 OFFICE O/P EST HI 40 MIN: CPT

## 2019-09-19 PROCEDURE — 93000 ELECTROCARDIOGRAM COMPLETE: CPT

## 2019-09-25 ENCOUNTER — RESULT REVIEW (OUTPATIENT)
Age: 79
End: 2019-09-25

## 2019-09-25 ENCOUNTER — APPOINTMENT (OUTPATIENT)
Dept: ULTRASOUND IMAGING | Facility: CLINIC | Age: 79
End: 2019-09-25
Payer: MEDICARE

## 2019-09-25 ENCOUNTER — OUTPATIENT (OUTPATIENT)
Dept: OUTPATIENT SERVICES | Facility: HOSPITAL | Age: 79
LOS: 1 days | End: 2019-09-25
Payer: MEDICARE

## 2019-09-25 ENCOUNTER — APPOINTMENT (OUTPATIENT)
Dept: MAMMOGRAPHY | Facility: CLINIC | Age: 79
End: 2019-09-25
Payer: MEDICARE

## 2019-09-25 DIAGNOSIS — N64.89 OTHER SPECIFIED DISORDERS OF BREAST: ICD-10-CM

## 2019-09-25 DIAGNOSIS — Z90.49 ACQUIRED ABSENCE OF OTHER SPECIFIED PARTS OF DIGESTIVE TRACT: Chronic | ICD-10-CM

## 2019-09-25 DIAGNOSIS — Z82.49 FAMILY HISTORY OF ISCHEMIC HEART DISEASE AND OTHER DISEASES OF THE CIRCULATORY SYSTEM: Chronic | ICD-10-CM

## 2019-09-25 DIAGNOSIS — Z98.51 TUBAL LIGATION STATUS: Chronic | ICD-10-CM

## 2019-09-25 DIAGNOSIS — Z90.89 ACQUIRED ABSENCE OF OTHER ORGANS: Chronic | ICD-10-CM

## 2019-09-25 PROCEDURE — 19083 BX BREAST 1ST LESION US IMAG: CPT

## 2019-09-25 PROCEDURE — 19081 BX BREAST 1ST LESION STRTCTC: CPT | Mod: LT

## 2019-09-25 PROCEDURE — 19081 BX BREAST 1ST LESION STRTCTC: CPT

## 2019-09-25 PROCEDURE — 77065 DX MAMMO INCL CAD UNI: CPT

## 2019-09-25 PROCEDURE — 77065 DX MAMMO INCL CAD UNI: CPT | Mod: 26,LT

## 2019-09-25 PROCEDURE — 88305 TISSUE EXAM BY PATHOLOGIST: CPT | Mod: 26

## 2019-09-25 PROCEDURE — A4648: CPT

## 2019-09-25 PROCEDURE — 19083 BX BREAST 1ST LESION US IMAG: CPT | Mod: LT

## 2019-09-25 PROCEDURE — 88305 TISSUE EXAM BY PATHOLOGIST: CPT

## 2019-09-29 PROCEDURE — 99285 EMERGENCY DEPT VISIT HI MDM: CPT | Mod: 25

## 2019-09-29 PROCEDURE — 71275 CT ANGIOGRAPHY CHEST: CPT

## 2019-09-29 PROCEDURE — 85610 PROTHROMBIN TIME: CPT

## 2019-09-29 PROCEDURE — 84484 ASSAY OF TROPONIN QUANT: CPT

## 2019-09-29 PROCEDURE — 80048 BASIC METABOLIC PNL TOTAL CA: CPT

## 2019-09-29 PROCEDURE — 83880 ASSAY OF NATRIURETIC PEPTIDE: CPT

## 2019-09-29 PROCEDURE — 86850 RBC ANTIBODY SCREEN: CPT

## 2019-09-29 PROCEDURE — 96374 THER/PROPH/DIAG INJ IV PUSH: CPT | Mod: XU

## 2019-09-29 PROCEDURE — 94640 AIRWAY INHALATION TREATMENT: CPT

## 2019-09-29 PROCEDURE — 85027 COMPLETE CBC AUTOMATED: CPT

## 2019-09-29 PROCEDURE — 86900 BLOOD TYPING SEROLOGIC ABO: CPT

## 2019-09-29 PROCEDURE — 85730 THROMBOPLASTIN TIME PARTIAL: CPT

## 2019-09-29 PROCEDURE — 80053 COMPREHEN METABOLIC PANEL: CPT

## 2019-09-29 PROCEDURE — 36415 COLL VENOUS BLD VENIPUNCTURE: CPT

## 2019-09-29 PROCEDURE — 86901 BLOOD TYPING SEROLOGIC RH(D): CPT

## 2019-09-29 PROCEDURE — 93005 ELECTROCARDIOGRAM TRACING: CPT

## 2019-09-29 PROCEDURE — 71045 X-RAY EXAM CHEST 1 VIEW: CPT

## 2019-10-17 ENCOUNTER — OUTPATIENT (OUTPATIENT)
Dept: OUTPATIENT SERVICES | Facility: HOSPITAL | Age: 79
LOS: 1 days | End: 2019-10-17
Payer: MEDICARE

## 2019-10-17 ENCOUNTER — APPOINTMENT (OUTPATIENT)
Dept: CT IMAGING | Facility: CLINIC | Age: 79
End: 2019-10-17
Payer: MEDICARE

## 2019-10-17 DIAGNOSIS — Z90.49 ACQUIRED ABSENCE OF OTHER SPECIFIED PARTS OF DIGESTIVE TRACT: Chronic | ICD-10-CM

## 2019-10-17 DIAGNOSIS — Z00.00 ENCOUNTER FOR GENERAL ADULT MEDICAL EXAMINATION WITHOUT ABNORMAL FINDINGS: ICD-10-CM

## 2019-10-17 DIAGNOSIS — Z98.51 TUBAL LIGATION STATUS: Chronic | ICD-10-CM

## 2019-10-17 DIAGNOSIS — Z82.49 FAMILY HISTORY OF ISCHEMIC HEART DISEASE AND OTHER DISEASES OF THE CIRCULATORY SYSTEM: Chronic | ICD-10-CM

## 2019-10-17 DIAGNOSIS — Z90.89 ACQUIRED ABSENCE OF OTHER ORGANS: Chronic | ICD-10-CM

## 2019-10-17 PROCEDURE — 71250 CT THORAX DX C-: CPT

## 2019-10-17 PROCEDURE — 71250 CT THORAX DX C-: CPT | Mod: 26

## 2020-01-02 NOTE — H&P PST ADULT - NSANTHSNORERD_ENT_A_CORE
Important Takeaway Points From This Visit:    We will call with your lab results and send them to [facility].    If your headache is improving, I would not look into it further at this time. Let me know if it comes up again.    Discuss with your kidney doctor that because of an old stroke seen on the CT scan it may be best to start a once daily 81 mg baby aspirin for secondary prevention. Please let me know their response.      As always, please call with any questions or concerns. I look forward to seeing you again soon!    Take care,  Dr. Ackerman    Your current medication list is printed. Please keep this with you - it is helpful to bring this current list to any other medical appointments. It can also be helpful if you ever go to the emergency room or hospital.    If you had lab testing today we will call you with the results. The phone number we will call with your results is # [phone] (home) none (work). If this is not the best number please call our clinic and change the number.    If you need any refills, please call your pharmacy and they will contact us.    If you have any further concerns or wish to schedule another appointment, please call our office at [phone].    If you have a medical emergency, please call [phone].    Thank you for coming to [facility]!      
Yes

## 2020-06-18 ENCOUNTER — APPOINTMENT (OUTPATIENT)
Dept: CARDIOLOGY | Facility: CLINIC | Age: 80
End: 2020-06-18

## 2020-07-03 ENCOUNTER — EMERGENCY (EMERGENCY)
Facility: HOSPITAL | Age: 80
LOS: 1 days | Discharge: HOME | End: 2020-07-03
Attending: EMERGENCY MEDICINE
Payer: MEDICARE

## 2020-07-03 VITALS
HEIGHT: 66 IN | WEIGHT: 179.9 LBS | SYSTOLIC BLOOD PRESSURE: 154 MMHG | RESPIRATION RATE: 18 BRPM | TEMPERATURE: 98 F | OXYGEN SATURATION: 95 % | HEART RATE: 75 BPM | DIASTOLIC BLOOD PRESSURE: 84 MMHG

## 2020-07-03 DIAGNOSIS — Z82.49 FAMILY HISTORY OF ISCHEMIC HEART DISEASE AND OTHER DISEASES OF THE CIRCULATORY SYSTEM: Chronic | ICD-10-CM

## 2020-07-03 DIAGNOSIS — Z90.89 ACQUIRED ABSENCE OF OTHER ORGANS: Chronic | ICD-10-CM

## 2020-07-03 DIAGNOSIS — Z98.51 TUBAL LIGATION STATUS: Chronic | ICD-10-CM

## 2020-07-03 DIAGNOSIS — Z90.49 ACQUIRED ABSENCE OF OTHER SPECIFIED PARTS OF DIGESTIVE TRACT: Chronic | ICD-10-CM

## 2020-07-03 PROCEDURE — 99282 EMERGENCY DEPT VISIT SF MDM: CPT

## 2020-07-03 NOTE — ED ADULT TRIAGE NOTE - MODE OF ARRIVAL
Problem: Discharge Planning  Goal: Discharge Planning (Adult, OB, Behavioral, Peds)  Outcome: Adequate for Discharge Date Met:  07/30/17  [id number] NSG DISCHARGE NOTE     Patient discharged to home at 2:24 PM via wheel chair. Accompanied by spouse and staff. Discharge instructions reviewed with patient and spouse, opportunity offered to ask questions. Prescriptions sent to patients preferred pharmacy. All belongings sent with patient.     Gwendolyn Vora       Walk in

## 2020-07-03 NOTE — ED PROVIDER NOTE - CLINICAL SUMMARY MEDICAL DECISION MAKING FREE TEXT BOX
pt comes in with right eye blurry vison that last 1 hour at 5 am   no pain , no weakness , no focal deficits   does not wants CT or work up at this time

## 2020-07-03 NOTE — ED ADULT TRIAGE NOTE - CHIEF COMPLAINT QUOTE
pt woke up and 5am with blurry vision to right eye that resolved at 7am, pt A&Ox4, denies fever/chills, numbness/tingling, weakness, difficulty ambulating

## 2020-07-03 NOTE — ED PROVIDER NOTE - PHYSICAL EXAMINATION
79 y/o female comes in for having experienced right sided blurry vision lasting x about hour after waking up   right eye has been giving her some issues and set to see ophthalmologist on monday   pt denies any symptoms    no n/v/d   no h/o glaucoma   pt was sent in for for evaluation   pt with normal neuro exam   understand stroke/ tia  cannot be ruled oy does not want a head ct or further evaluation     [facility] OD  20/70 OS 20/50

## 2020-07-03 NOTE — ED PROVIDER NOTE - PATIENT PORTAL LINK FT
You can access the FollowMyHealth Patient Portal offered by [facility] by registering at the following website: [email or web address]. By joining [employer]s FollowMyHealth portal, you will also be able to view your health information using other applications (apps) compatible with our system.

## 2020-07-03 NOTE — ED PROVIDER NOTE - CONSTITUTIONAL APPEARANCE HYGIENE, MLM
Patient called this morning to report that he had his INR checked in [place] on Monday and he usually hears back the next day. Advised patient we have not received this result as of today but writer would call and follow-up on that.    well appearing

## 2020-07-03 NOTE — ED PROVIDER NOTE - OBJECTIVE STATEMENT
79 y/o female comes in for having experienced right sided blurry vision lasting x about hour after waking up , no vision loss like cloudy   right eye has been giving her some issues and set to see ophthalmologist on monday   pt denies any symptoms    no n/v/d   no h/o glaucoma   pt was sent in for for evaluation   pt with normal neuro exam   understand stroke/ tia  cannot be ruled oy does not want a head ct or further evaluation

## 2020-08-20 ENCOUNTER — APPOINTMENT (OUTPATIENT)
Dept: CARDIOLOGY | Facility: CLINIC | Age: 80
End: 2020-08-20

## 2020-08-28 ENCOUNTER — NON-APPOINTMENT (OUTPATIENT)
Age: 80
End: 2020-08-28

## 2020-08-28 ENCOUNTER — APPOINTMENT (OUTPATIENT)
Dept: CARDIOLOGY | Facility: CLINIC | Age: 80
End: 2020-08-28
Payer: MEDICARE

## 2020-08-28 VITALS
DIASTOLIC BLOOD PRESSURE: 106 MMHG | HEIGHT: 66 IN | SYSTOLIC BLOOD PRESSURE: 164 MMHG | BODY MASS INDEX: 29.73 KG/M2 | OXYGEN SATURATION: 97 % | TEMPERATURE: 97.1 F | WEIGHT: 185 LBS | HEART RATE: 69 BPM

## 2020-08-28 LAB — NT-PROBNP SERPL-MCNC: 1563 PG/ML

## 2020-08-28 PROCEDURE — 93000 ELECTROCARDIOGRAM COMPLETE: CPT

## 2020-08-28 PROCEDURE — 99214 OFFICE O/P EST MOD 30 MIN: CPT

## 2020-08-28 RX ORDER — FEXOFENADINE HYDROCHLORIDE 180 MG/1
180 TABLET, FILM COATED ORAL DAILY
Refills: 0 | Status: ACTIVE | COMMUNITY

## 2020-08-31 ENCOUNTER — APPOINTMENT (OUTPATIENT)
Dept: CARDIOLOGY | Facility: CLINIC | Age: 80
End: 2020-08-31
Payer: MEDICARE

## 2020-08-31 PROCEDURE — 93306 TTE W/DOPPLER COMPLETE: CPT

## 2020-09-17 NOTE — H&P ADULT - ASSESSMENT
[Urgent Visit] : Urgent Visit [FreeTextEntry1] : This 32 yo P000 LMP 8/20/20 presents c/o pelvic pain and irregular bleeding for 2 weeks after menses; reports menstrual cycles usually monthly and regular lasting up to 7-8 days, but the last cycle lasted up to 14 days, with clotting and cramping; sexually active, uses condoms; the pelvic pain just started 3-4 days ago, with pain rated up to 8/10 and is presently 3/10 but it is intermittent in nature.  Some urinary frequency over the past 3-4 days, also noting vaginal itching, with no change to discharge; denies fever or chills. No notable change in voiding pattern. 79F presented with cough and hemoptysis    Hemoptysis / Pneumonia - CT of the chest noted right sided infiltrate. Empiric antibiotics initiated. No hypoxia noted. To hold rivaroxaban for now.     CAD - On aspirin and statin therapy.    Atrial fibrillation - On metoprolol for rate control. Rivaroxaban to be held for now.    Hypothyroidism - On levothyroxine.    Anxiety / Insomnia - On clonazepam as needed.

## 2020-09-22 ENCOUNTER — APPOINTMENT (OUTPATIENT)
Dept: CARDIOLOGY | Facility: CLINIC | Age: 80
End: 2020-09-22
Payer: MEDICARE

## 2020-09-22 VITALS
HEART RATE: 92 BPM | WEIGHT: 186 LBS | SYSTOLIC BLOOD PRESSURE: 142 MMHG | HEIGHT: 66 IN | DIASTOLIC BLOOD PRESSURE: 86 MMHG | BODY MASS INDEX: 29.89 KG/M2 | OXYGEN SATURATION: 99 %

## 2020-09-22 PROCEDURE — 99215 OFFICE O/P EST HI 40 MIN: CPT

## 2020-10-27 LAB
ANION GAP SERPL CALC-SCNC: 12 MMOL/L
BUN SERPL-MCNC: 27 MG/DL
CALCIUM SERPL-MCNC: 9.8 MG/DL
CHLORIDE SERPL-SCNC: 98 MMOL/L
CO2 SERPL-SCNC: 29 MMOL/L
CREAT SERPL-MCNC: 1 MG/DL
GLUCOSE SERPL-MCNC: 96 MG/DL
NT-PROBNP SERPL-MCNC: 983 PG/ML
POTASSIUM SERPL-SCNC: 3.9 MMOL/L
SODIUM SERPL-SCNC: 139 MMOL/L

## 2020-11-05 ENCOUNTER — NON-APPOINTMENT (OUTPATIENT)
Age: 80
End: 2020-11-05

## 2020-11-05 ENCOUNTER — APPOINTMENT (OUTPATIENT)
Dept: CARDIOLOGY | Facility: CLINIC | Age: 80
End: 2020-11-05
Payer: MEDICARE

## 2020-11-05 VITALS
OXYGEN SATURATION: 98 % | DIASTOLIC BLOOD PRESSURE: 80 MMHG | WEIGHT: 186 LBS | BODY MASS INDEX: 29.89 KG/M2 | SYSTOLIC BLOOD PRESSURE: 144 MMHG | HEIGHT: 66 IN | TEMPERATURE: 98.1 F | HEART RATE: 72 BPM

## 2020-11-05 PROCEDURE — 99214 OFFICE O/P EST MOD 30 MIN: CPT

## 2020-11-05 PROCEDURE — 93000 ELECTROCARDIOGRAM COMPLETE: CPT

## 2020-11-17 ENCOUNTER — APPOINTMENT (OUTPATIENT)
Dept: CARDIOLOGY | Facility: CLINIC | Age: 80
End: 2020-11-17
Payer: MEDICARE

## 2020-11-17 PROCEDURE — 93015 CV STRESS TEST SUPVJ I&R: CPT

## 2020-11-17 PROCEDURE — A9500: CPT

## 2020-11-17 PROCEDURE — 78452 HT MUSCLE IMAGE SPECT MULT: CPT

## 2020-11-28 ENCOUNTER — NON-APPOINTMENT (OUTPATIENT)
Age: 80
End: 2020-11-28

## 2020-12-04 ENCOUNTER — APPOINTMENT (OUTPATIENT)
Dept: DISASTER EMERGENCY | Facility: CLINIC | Age: 80
End: 2020-12-04

## 2020-12-04 DIAGNOSIS — Z01.818 ENCOUNTER FOR OTHER PREPROCEDURAL EXAMINATION: ICD-10-CM

## 2020-12-05 LAB — SARS-COV-2 N GENE NPH QL NAA+PROBE: NOT DETECTED

## 2020-12-07 ENCOUNTER — INPATIENT (INPATIENT)
Facility: HOSPITAL | Age: 80
LOS: 0 days | Discharge: HOME | DRG: 247 | End: 2020-12-08
Attending: INTERNAL MEDICINE | Admitting: INTERNAL MEDICINE
Payer: MEDICARE

## 2020-12-07 ENCOUNTER — TRANSCRIPTION ENCOUNTER (OUTPATIENT)
Age: 80
End: 2020-12-07

## 2020-12-07 VITALS
DIASTOLIC BLOOD PRESSURE: 92 MMHG | TEMPERATURE: 98 F | OXYGEN SATURATION: 96 % | RESPIRATION RATE: 24 BRPM | HEART RATE: 80 BPM | SYSTOLIC BLOOD PRESSURE: 144 MMHG

## 2020-12-07 DIAGNOSIS — Z90.89 ACQUIRED ABSENCE OF OTHER ORGANS: Chronic | ICD-10-CM

## 2020-12-07 DIAGNOSIS — Z98.51 TUBAL LIGATION STATUS: Chronic | ICD-10-CM

## 2020-12-07 DIAGNOSIS — R94.39 ABNORMAL RESULT OF OTHER CARDIOVASCULAR FUNCTION STUDY: ICD-10-CM

## 2020-12-07 DIAGNOSIS — Z90.49 ACQUIRED ABSENCE OF OTHER SPECIFIED PARTS OF DIGESTIVE TRACT: Chronic | ICD-10-CM

## 2020-12-07 DIAGNOSIS — Z82.49 FAMILY HISTORY OF ISCHEMIC HEART DISEASE AND OTHER DISEASES OF THE CIRCULATORY SYSTEM: Chronic | ICD-10-CM

## 2020-12-07 DIAGNOSIS — I25.10 ATHEROSCLEROTIC HEART DISEASE OF NATIVE CORONARY ARTERY WITHOUT ANGINA PECTORIS: ICD-10-CM

## 2020-12-07 LAB
ANION GAP SERPL CALC-SCNC: 12 MMOL/L — SIGNIFICANT CHANGE UP (ref 5–17)
ANISOCYTOSIS BLD QL: SLIGHT — SIGNIFICANT CHANGE UP
APTT BLD: 27.8 SEC — SIGNIFICANT CHANGE UP (ref 27.5–35.5)
BASOPHILS # BLD AUTO: 0.06 K/UL — SIGNIFICANT CHANGE UP (ref 0–0.2)
BASOPHILS NFR BLD AUTO: 0.9 % — SIGNIFICANT CHANGE UP (ref 0–2)
BLD GP AB SCN SERPL QL: SIGNIFICANT CHANGE UP
BUN SERPL-MCNC: 27 MG/DL — HIGH (ref 8–20)
CALCIUM SERPL-MCNC: 9.5 MG/DL — SIGNIFICANT CHANGE UP (ref 8.6–10.2)
CHLORIDE SERPL-SCNC: 100 MMOL/L — SIGNIFICANT CHANGE UP (ref 98–107)
CO2 SERPL-SCNC: 31 MMOL/L — HIGH (ref 22–29)
CREAT SERPL-MCNC: 0.99 MG/DL — SIGNIFICANT CHANGE UP (ref 0.5–1.3)
EOSINOPHIL # BLD AUTO: 0 K/UL — SIGNIFICANT CHANGE UP (ref 0–0.5)
EOSINOPHIL NFR BLD AUTO: 0 % — SIGNIFICANT CHANGE UP (ref 0–6)
GIANT PLATELETS BLD QL SMEAR: PRESENT — SIGNIFICANT CHANGE UP
GLUCOSE SERPL-MCNC: 121 MG/DL — HIGH (ref 70–99)
HCT VFR BLD CALC: 49.2 % — HIGH (ref 34.5–45)
HGB BLD-MCNC: 16.2 G/DL — HIGH (ref 11.5–15.5)
INR BLD: 1.05 RATIO — SIGNIFICANT CHANGE UP (ref 0.88–1.16)
LYMPHOCYTES # BLD AUTO: 1.03 K/UL — SIGNIFICANT CHANGE UP (ref 1–3.3)
LYMPHOCYTES # BLD AUTO: 14.8 % — SIGNIFICANT CHANGE UP (ref 13–44)
MACROCYTES BLD QL: SLIGHT — SIGNIFICANT CHANGE UP
MANUAL SMEAR VERIFICATION: SIGNIFICANT CHANGE UP
MCHC RBC-ENTMCNC: 30.9 PG — SIGNIFICANT CHANGE UP (ref 27–34)
MCHC RBC-ENTMCNC: 32.9 GM/DL — SIGNIFICANT CHANGE UP (ref 32–36)
MCV RBC AUTO: 93.9 FL — SIGNIFICANT CHANGE UP (ref 80–100)
MONOCYTES # BLD AUTO: 0.67 K/UL — SIGNIFICANT CHANGE UP (ref 0–0.9)
MONOCYTES NFR BLD AUTO: 9.6 % — SIGNIFICANT CHANGE UP (ref 2–14)
NEUTROPHILS # BLD AUTO: 4.7 K/UL — SIGNIFICANT CHANGE UP (ref 1.8–7.4)
NEUTROPHILS NFR BLD AUTO: 67.8 % — SIGNIFICANT CHANGE UP (ref 43–77)
OVALOCYTES BLD QL SMEAR: SLIGHT — SIGNIFICANT CHANGE UP
PLAT MORPH BLD: ABNORMAL
PLATELET # BLD AUTO: 199 K/UL — SIGNIFICANT CHANGE UP (ref 150–400)
POLYCHROMASIA BLD QL SMEAR: SLIGHT — SIGNIFICANT CHANGE UP
POTASSIUM SERPL-MCNC: 3.8 MMOL/L — SIGNIFICANT CHANGE UP (ref 3.5–5.3)
POTASSIUM SERPL-SCNC: 3.8 MMOL/L — SIGNIFICANT CHANGE UP (ref 3.5–5.3)
PROTHROM AB SERPL-ACNC: 12.1 SEC — SIGNIFICANT CHANGE UP (ref 10.6–13.6)
RBC # BLD: 5.24 M/UL — HIGH (ref 3.8–5.2)
RBC # FLD: 12.9 % — SIGNIFICANT CHANGE UP (ref 10.3–14.5)
RBC BLD AUTO: ABNORMAL
SODIUM SERPL-SCNC: 143 MMOL/L — SIGNIFICANT CHANGE UP (ref 135–145)
VARIANT LYMPHS # BLD: 6.9 % — HIGH (ref 0–6)
WBC # BLD: 6.93 K/UL — SIGNIFICANT CHANGE UP (ref 3.8–10.5)
WBC # FLD AUTO: 6.93 K/UL — SIGNIFICANT CHANGE UP (ref 3.8–10.5)

## 2020-12-07 PROCEDURE — 99152 MOD SED SAME PHYS/QHP 5/>YRS: CPT

## 2020-12-07 PROCEDURE — 93458 L HRT ARTERY/VENTRICLE ANGIO: CPT | Mod: 26,XU

## 2020-12-07 PROCEDURE — 92978 ENDOLUMINL IVUS OCT C 1ST: CPT | Mod: 26,LD

## 2020-12-07 PROCEDURE — 99223 1ST HOSP IP/OBS HIGH 75: CPT | Mod: 25

## 2020-12-07 PROCEDURE — 93010 ELECTROCARDIOGRAM REPORT: CPT

## 2020-12-07 PROCEDURE — 92928 PRQ TCAT PLMT NTRAC ST 1 LES: CPT | Mod: LD

## 2020-12-07 RX ORDER — CLOPIDOGREL BISULFATE 75 MG/1
75 TABLET, FILM COATED ORAL DAILY
Refills: 0 | Status: DISCONTINUED | OUTPATIENT
Start: 2020-12-08 | End: 2020-12-08

## 2020-12-07 RX ORDER — FENOFIBRATE,MICRONIZED 130 MG
145 CAPSULE ORAL DAILY
Refills: 0 | Status: DISCONTINUED | OUTPATIENT
Start: 2020-12-07 | End: 2020-12-08

## 2020-12-07 RX ORDER — METOPROLOL TARTRATE 50 MG
1 TABLET ORAL
Qty: 0 | Refills: 0 | DISCHARGE

## 2020-12-07 RX ORDER — CLONAZEPAM 1 MG
0.5 TABLET ORAL
Refills: 0 | Status: DISCONTINUED | OUTPATIENT
Start: 2020-12-07 | End: 2020-12-08

## 2020-12-07 RX ORDER — ASPIRIN/CALCIUM CARB/MAGNESIUM 324 MG
81 TABLET ORAL DAILY
Refills: 0 | Status: DISCONTINUED | OUTPATIENT
Start: 2020-12-07 | End: 2020-12-07

## 2020-12-07 RX ORDER — FUROSEMIDE 40 MG
1 TABLET ORAL
Qty: 0 | Refills: 0 | DISCHARGE

## 2020-12-07 RX ORDER — ONDANSETRON 8 MG/1
4 TABLET, FILM COATED ORAL ONCE
Refills: 0 | Status: COMPLETED | OUTPATIENT
Start: 2020-12-07 | End: 2020-12-07

## 2020-12-07 RX ORDER — FEXOFENADINE HCL 30 MG
1 TABLET ORAL
Qty: 0 | Refills: 0 | DISCHARGE

## 2020-12-07 RX ORDER — ROSUVASTATIN CALCIUM 5 MG/1
1 TABLET ORAL
Qty: 0 | Refills: 0 | DISCHARGE

## 2020-12-07 RX ORDER — FUROSEMIDE 40 MG
0 TABLET ORAL
Qty: 0 | Refills: 0 | DISCHARGE

## 2020-12-07 RX ORDER — LORATADINE 10 MG/1
10 TABLET ORAL DAILY
Refills: 0 | Status: DISCONTINUED | OUTPATIENT
Start: 2020-12-07 | End: 2020-12-08

## 2020-12-07 RX ORDER — METOPROLOL TARTRATE 50 MG
2 TABLET ORAL
Qty: 0 | Refills: 0 | DISCHARGE

## 2020-12-07 RX ORDER — METOPROLOL TARTRATE 50 MG
50 TABLET ORAL AT BEDTIME
Refills: 0 | Status: DISCONTINUED | OUTPATIENT
Start: 2020-12-07 | End: 2020-12-08

## 2020-12-07 RX ORDER — ZALEPLON 10 MG
1 CAPSULE ORAL
Qty: 0 | Refills: 0 | DISCHARGE

## 2020-12-07 RX ORDER — CHOLECALCIFEROL (VITAMIN D3) 125 MCG
2000 CAPSULE ORAL DAILY
Refills: 0 | Status: DISCONTINUED | OUTPATIENT
Start: 2020-12-07 | End: 2020-12-08

## 2020-12-07 RX ORDER — FUROSEMIDE 40 MG
40 TABLET ORAL DAILY
Refills: 0 | Status: DISCONTINUED | OUTPATIENT
Start: 2020-12-08 | End: 2020-12-08

## 2020-12-07 RX ORDER — CLONAZEPAM 1 MG
1 TABLET ORAL
Qty: 0 | Refills: 0 | DISCHARGE

## 2020-12-07 RX ORDER — ATORVASTATIN CALCIUM 80 MG/1
80 TABLET, FILM COATED ORAL AT BEDTIME
Refills: 0 | Status: DISCONTINUED | OUTPATIENT
Start: 2020-12-07 | End: 2020-12-08

## 2020-12-07 RX ORDER — ZALEPLON 10 MG
5 CAPSULE ORAL AT BEDTIME
Refills: 0 | Status: DISCONTINUED | OUTPATIENT
Start: 2020-12-07 | End: 2020-12-08

## 2020-12-07 RX ORDER — LEVOTHYROXINE SODIUM 125 MCG
125 TABLET ORAL DAILY
Refills: 0 | Status: DISCONTINUED | OUTPATIENT
Start: 2020-12-07 | End: 2020-12-08

## 2020-12-07 RX ORDER — SENNOSIDES/DOCUSATE SODIUM 8.6MG-50MG
1 TABLET ORAL
Qty: 0 | Refills: 0 | DISCHARGE

## 2020-12-07 RX ORDER — ACETAMINOPHEN 500 MG
650 TABLET ORAL EVERY 6 HOURS
Refills: 0 | Status: DISCONTINUED | OUTPATIENT
Start: 2020-12-07 | End: 2020-12-08

## 2020-12-07 RX ORDER — METOPROLOL TARTRATE 50 MG
100 TABLET ORAL DAILY
Refills: 0 | Status: DISCONTINUED | OUTPATIENT
Start: 2020-12-07 | End: 2020-12-08

## 2020-12-07 RX ORDER — PANTOPRAZOLE SODIUM 20 MG/1
40 TABLET, DELAYED RELEASE ORAL
Refills: 0 | Status: DISCONTINUED | OUTPATIENT
Start: 2020-12-07 | End: 2020-12-08

## 2020-12-07 RX ORDER — SODIUM CHLORIDE 9 MG/ML
300 INJECTION INTRAMUSCULAR; INTRAVENOUS; SUBCUTANEOUS ONCE
Refills: 0 | Status: COMPLETED | OUTPATIENT
Start: 2020-12-07 | End: 2020-12-07

## 2020-12-07 RX ORDER — NITROGLYCERIN 6.5 MG
1 CAPSULE, EXTENDED RELEASE ORAL ONCE
Refills: 0 | Status: COMPLETED | OUTPATIENT
Start: 2020-12-07 | End: 2020-12-07

## 2020-12-07 RX ORDER — SODIUM CHLORIDE 9 MG/ML
1000 INJECTION INTRAMUSCULAR; INTRAVENOUS; SUBCUTANEOUS
Refills: 0 | Status: DISCONTINUED | OUTPATIENT
Start: 2020-12-07 | End: 2020-12-08

## 2020-12-07 RX ORDER — LEVOTHYROXINE SODIUM 125 MCG
1 TABLET ORAL
Qty: 0 | Refills: 0 | DISCHARGE

## 2020-12-07 RX ORDER — ASCORBIC ACID 60 MG
500 TABLET,CHEWABLE ORAL DAILY
Refills: 0 | Status: DISCONTINUED | OUTPATIENT
Start: 2020-12-07 | End: 2020-12-08

## 2020-12-07 RX ORDER — RIVAROXABAN 15 MG-20MG
20 KIT ORAL
Refills: 0 | Status: DISCONTINUED | OUTPATIENT
Start: 2020-12-07 | End: 2020-12-08

## 2020-12-07 RX ORDER — L.ACIDOPH/B.ANIMALIS/B.LONGUM 15B CELL
1 CAPSULE ORAL
Qty: 0 | Refills: 0 | DISCHARGE

## 2020-12-07 RX ADMIN — ONDANSETRON 4 MILLIGRAM(S): 8 TABLET, FILM COATED ORAL at 15:31

## 2020-12-07 RX ADMIN — ATORVASTATIN CALCIUM 80 MILLIGRAM(S): 80 TABLET, FILM COATED ORAL at 21:06

## 2020-12-07 RX ADMIN — Medication 50 MILLIGRAM(S): at 21:06

## 2020-12-07 RX ADMIN — Medication 0.5 MILLIGRAM(S): at 15:36

## 2020-12-07 RX ADMIN — RIVAROXABAN 20 MILLIGRAM(S): KIT at 17:15

## 2020-12-07 RX ADMIN — Medication 81 MILLIGRAM(S): at 11:55

## 2020-12-07 RX ADMIN — Medication 1 INCH(S): at 13:34

## 2020-12-07 RX ADMIN — SODIUM CHLORIDE 300 MILLILITER(S): 9 INJECTION INTRAMUSCULAR; INTRAVENOUS; SUBCUTANEOUS at 12:00

## 2020-12-07 RX ADMIN — Medication 5 MILLIGRAM(S): at 21:05

## 2020-12-07 NOTE — H&P PST ADULT - CARDIOVASCULAR DETAILS
Pritesh Rivera is a 70 year old male who presents to the office for followup on DMII, HTN, hyperlipidemia, and CAD    He has noticed significant improvement in his left sciatica with the PT.     The patient has been checking their blood sugars since the last office visit and has been getting readings of 90 day avg = 111 93 - 121 in AM and  81 - 125 in PM    No episodes of hypoglycemia He feels it is doing well.     The patient has not been checking their blood pressure since the last office visit   He feels it is doing well.     The patient has been working on their cholesterol diet. He feels it is doing well.     He denies any chest pain, sob, or dyspnea on exertion. He feels it is doing well.     ROS:  The patient denies any fever/chills, n/v/d, lightheadedness/dizziness, tinnitus, sore throat, cough, sob, wheezing, chest pain, abd pain, or rash.      Nurse's notes reviewed and agreed with.    Past Medical History:   Diagnosis Date   • Allergic rhinitis, cause unspecified    • Coronary atherosclerosis of native coronary artery 9/9/09   • Esophageal reflux    • Essential hypertension, benign 09/09/2009   • Heart contusion without mention of open wound into thorax 09/09/2009   • Mixed hyperlipidemia 09/09/2009   • Special screening for malignant neoplasms, colon 2/23/2011    Samara/Omar       Past Surgical History:   Procedure Laterality Date   • Colorec canc scrn,colonoscopy not hi risk  2/23/2011    Dr. Nelson/Colorectal Screening/recall 2/23/2021   • Laparoscopic vasectomy  1989   • Left heart cath,percutaneous  9/09    Cardiac Cath with stent placement   • Myocardial perfusion rest or stress mult st  08/17/2011    Normal   • Myocardial perfusion rest or stress mult st  11/05/2013    Dr Wilburn-see report   • Myocardial perfusion rest or stress mult st  11/15/2017    see report   • Nm tiffany per rst/strs pharmacolo  10/29/2009    Scan normal. NO ischemia. Normal EKG   • Nm tiffany per rst/strs pharmacolo   11/05/2013    Mild dyspnea   • Removal of tonsils,<13 y/o      age 7   • Spirometry  02/22/200601/31/2007    normal       ALLERGIES:  No Known Allergies    Current Outpatient Medications   Medication Sig Dispense Refill   • metformin (GLUCOPHAGE-XR) 500 MG 24 hr tablet Take 1 tablet by mouth daily (with breakfast). 90 tablet 3   • SOFTCLIX LANCETS Misc Test blood sugars two times daily DX: E11.9 100 each 11   • ACCU-CHEK FASTCLIX LANCETS Misc Test blood sugars two times daily 100 each 11   • Blood Glucose Monitoring Suppl (ACCU-CHEK VALDEZ) Device Test 2 times daily 1 Device 0   • blood glucose (ACCU-CHEK VALDEZ) test strip Test blood sugar 2 times daily as directed. Diagnosis: DMII. Meter: Valdez 100 strip 11   • Lancets Misc. (ACCU-CHEK FASTCLIX LANCET) Kit 1 each by Other route 2 times daily. 1 kit 0   • atorvastatin (LIPITOR) 20 MG tablet Take 1 tablet by mouth daily. 90 tablet 3   • carvedilol (COREG) 25 MG tablet Take 1 tablet by mouth 2 times daily. 180 tablet 3   • clopidogrel (PLAVIX) 75 MG tablet Take 1 tablet by mouth daily. 90 tablet 3   • hydrochlorothiazide (HYDRODIURIL) 25 MG tablet Take 1 tablet by mouth daily. 90 tablet 3   • enalapril (VASOTEC) 20 MG tablet Take 1 tablet by mouth 2 times daily. Dr Wilburn 60 tablet 0     No current facility-administered medications for this visit.        Family History   Adopted: Yes       Social History     Tobacco Use   • Smoking status: Former Smoker     Types: Cigarettes     Last attempt to quit: 4/20/2008     Years since quitting: 10.6   • Smokeless tobacco: Never Used   • Tobacco comment: cigar smoker   Substance Use Topics   • Alcohol use: No     Alcohol/week: 0.0 oz     Comment: occasional     PE:  GEN - NAD, A&OX3, generally healthy appearance  HEART - RRR without M/R/C/G, PMI nondisplaced.  LUNGS - CTA B, good AE, no wz, no retractions.  ALL EXT - FROM, muscle strength 5/5 all ext and symmetric;       2/4 distal pulses all ext, no edema, good cap refill,        intact sensation, negative Jose's.  Right: Skin integrity is normal - no erythema, blisters, callosities, or ulcers . Dorsalis pedis and posterial tibial pulses are present. Pressure sensation using the Worthington-Jeannine is absent.  Left: Skin integrity is normal - no erythema, blisters, callosities, or ulcers . Dorsalis pedis and posterial tibial pulses are present. Pressure sensation using the Worthington-Jeannine is absent.      SKIN - No rashes/lesions.      A -  1) DMII   2) HTN   3) Hyperlipidemia   4) CAD    P -  1) DMII - Stable - discussed treatment options. Discussed potential side effects of the medications/procedures with the patient. The patient wishes to continue current regimen.      2) HTN - Stable - discussed treatment options. Discussed potential side effects of the medications/procedures with the patient. The patient wishes to continue current regimen.      3) Hyperlipidemia - Stable - discussed treatment options. Discussed potential side effects of the medications/procedures with the patient. The patient wishes to continue current regimen.      4) CAD -Stable - discussed treatment options. Discussed potential side effects of the medications/procedures with the patient. The patient wishes to continue current regimen.     Patient to follow up for routine health care and prn.      irregular rate and rhythm

## 2020-12-07 NOTE — DISCHARGE NOTE PROVIDER - NSDCCPTREATMENT_GEN_ALL_CORE_FT
PRINCIPAL PROCEDURE  Procedure: Left heart cardiac cath  Findings and Treatment: PCI with BARAK X1 mLAD

## 2020-12-07 NOTE — DISCHARGE NOTE PROVIDER - HOSPITAL COURSE
80 year old female with h/o Aifb on xarelto, asthma, HTN thyroid disease, varicose veins, PNA wiht hematochezia, prior STEMI 2016 with BARAK to mLAD who c/o DE LA O.

## 2020-12-07 NOTE — DISCHARGE NOTE PROVIDER - NSDCFUADDAPPT_GEN_ALL_CORE_FT
Please call and make an appointment with Dr. Franco's office for hospital follow up post procedure in 1-2 weeks.

## 2020-12-07 NOTE — H&P PST ADULT - ASSESSMENT
80 year old female with prior mLAD stent who c/o DE LA O with positive stress test.  For LHC    Last dose xarelto 12/4/2020    ASA 2  Mallampati 2  Bleeding risk 8.8%  GFR 55    -Meets criteria for hydration

## 2020-12-07 NOTE — DISCHARGE NOTE PROVIDER - NSDCMRMEDTOKEN_GEN_ALL_CORE_FT
Allegra 180 mg oral tablet: 1 tab(s) orally once a day  clonazePAM 0.5 mg oral tablet: 1 tab(s) orally 2 times a day, As Needed  fenofibrate 145 mg oral tablet: 1 tab(s) orally once a day  furosemide 40 mg oral tablet: 1 tab(s) orally 3 times a week sun tues thurs sat  furosemide 80 mg oral tablet: orally 3 times a week mon wed fri  lansoprazole 30 mg oral delayed release capsule: 30 milligram(s) orally once a day  levothyroxine 125 mcg (0.125 mg) oral tablet: 1 tab(s) orally once a day  metoprolol tartrate 50 mg oral tablet: 1 tab(s) orally once a day (at bedtime)  Metoprolol Tartrate 50 mg oral tablet: 2 tab(s) orally once a day  Multiple Vitamins oral tablet: 1 tab(s) orally once a day  Probiotic Formula oral capsule: 1 cap(s) orally once a day  rosuvastatin 20 mg oral tablet: 1 tab(s) orally once a day  Stool Softener with Laxative 50 mg-8.6 mg oral tablet: 1 tab(s) orally once a day (at bedtime)  Vitamin C 1000 mg oral tablet: 1 tab(s) orally twice per  day  Vitamin D3 2000 intl units oral capsule: 1 cap(s) orally once a day  Xarelto 20 mg oral tablet: 1 tab(s) orally once a day (in the evening)  zaleplon 10 mg oral capsule: 1 cap(s) orally once a day (at bedtime)   Allegra 180 mg oral tablet: 1 tab(s) orally once a day  atorvastatin 80 mg oral tablet: 1 tab(s) orally once a day (at bedtime)  clonazePAM 0.5 mg oral tablet: 1 tab(s) orally 2 times a day, As Needed  clopidogrel 75 mg oral tablet: 1 tab(s) orally once a day  fenofibrate 145 mg oral tablet: 1 tab(s) orally once a day  furosemide 40 mg oral tablet: 1 tab(s) orally 3 times a week sun tues thurs sat  furosemide 80 mg oral tablet: orally 3 times a week mon wed fri  lansoprazole 30 mg oral delayed release capsule: 30 milligram(s) orally once a day  levothyroxine 125 mcg (0.125 mg) oral tablet: 1 tab(s) orally once a day  metoprolol tartrate 50 mg oral tablet: 1 tab(s) orally once a day (at bedtime)  Metoprolol Tartrate 50 mg oral tablet: 2 tab(s) orally once a day  Multiple Vitamins oral tablet: 1 tab(s) orally once a day  pantoprazole 40 mg oral delayed release tablet: 1 tab(s) orally once a day (before a meal)  Probiotic Formula oral capsule: 1 cap(s) orally once a day  rosuvastatin 20 mg oral tablet: 1 tab(s) orally once a day  Stool Softener with Laxative 50 mg-8.6 mg oral tablet: 1 tab(s) orally once a day (at bedtime)  Vitamin C 1000 mg oral tablet: 1 tab(s) orally twice per  day  Vitamin D3 2000 intl units oral capsule: 1 cap(s) orally once a day  Xarelto 20 mg oral tablet: 1 tab(s) orally once a day (in the evening)  zaleplon 10 mg oral capsule: 1 cap(s) orally once a day (at bedtime)

## 2020-12-07 NOTE — H&P PST ADULT - NSICDXPROBLEM_GEN_ALL_CORE_FT
PROBLEM DIAGNOSES  Problem: Abnormal stress test  Assessment and Plan: [facility]

## 2020-12-07 NOTE — DISCHARGE NOTE PROVIDER - NSDCFUADDINST_GEN_ALL_CORE_FT
No heavy lifting, driving, sex, tub baths, swimming, or any activity that submerges the lower half of the body in water for 48 hours.  Limited walking and stairs for 48 hours.    Change the bandaid after 24 hours and every 24 hours after that.  Keep the puncture site dry and covered with a bandaid until a scab forms.    Observe the site frequently.  If bleeding or a large lump (the size of a golf ball or bigger) occurs lie flat, apply continuous direct pressure just above the puncture site for at least 10 minutes, and notify your physician immediately.  If the bleeding cannot be controlled, call 911 immediately for assistance.  Notify your physician of pain, swelling or any drainage.    Notify your physician immediately if coldness, numbness, discoloration or pain in your foot occurs.    cardiac rehab info provided/referral and communication to cardiac rehab completed

## 2020-12-07 NOTE — DISCHARGE NOTE PROVIDER - CARE PROVIDER_API CALL
Dion Franco)  Cardiovascular Disease; Interventional Cardiology  [address] [facility], [address] [place] [address]  Phone: [phone]  Fax: [phone]  Follow Up Time:

## 2020-12-07 NOTE — DISCHARGE NOTE PROVIDER - NSDCCPCAREPLAN_GEN_ALL_CORE_FT
PRINCIPAL DISCHARGE DIAGNOSIS  Diagnosis: CAD in native artery  Assessment and Plan of Treatment: s/p PCI

## 2020-12-07 NOTE — H&P PST ADULT - NSICDXPASTMEDICALHX_GEN_ALL_CORE_FT
PAST MEDICAL HISTORY:  Abnormal stress test     Afib     Anxiety     Asthma     DE LA O (dyspnea on exertion)     Essential hypertension     H/O thyroid disease     High cholesterol     Hypothyroid     Myocardial infarction has 2 stents    Stented coronary artery

## 2020-12-07 NOTE — H&P PST ADULT - HISTORY OF PRESENT ILLNESS
80 year old female with h/o Aifb on xarelto, asthma, HTN thyroid disease, varicose veins, PNA wiht hematochezia, prior STEMI 2016 with BARAK to mLAD who c/o DE LA O.    Symptoms:        Angina (Class): DE LA O       Ischemic Symptoms: II-III    Heart Failure:        Systolic/Diastolic/Combined:        NYHA Class (within 2 weeks):     Assessment of LVEF:       EF: 62%       Assessed by: TTE       Date: 8/31/2020    Prior Cardiac Interventions:       PCI's: 2016 BARAK X1 mLAD       CABG: N/A    Noninvasive Testing:   Stress Test: Date: 11/17/20       Protocol: Regadenoson       Symptoms: SOB       EKG Changes: rare PVC       Myocardial Imaging: mod ischemia in circumflex territory       Risk Assessment:       Antianginal Therapies:        Beta Blockers:  Toprolol       Calcium Channel Blockers:        Long Acting Nitrates:        Ranexa:     Associated Risk Factors:        Cerebrovascular Disease: N/A       Chronic Lung Disease: N/A       Peripheral Arterial Disease: N/A       Chronic Kidney Disease (if yes, what is GFR): 59       Uncontrolled Diabetes (if yes, what is HgbA1C or FBS): N/A       Poorly Controlled Hypertension (if yes, what is SBP): N/A       Morbid Obesity (if yes, what is BMI): N/A       History of Recent Ventricular Arrhythmia: N/A       Inability to Ambulate Safely: N/A       Need for Therapeutic Anticoagulation: Yes-afib       Antiplatelet or Contrast Allergy: N/A

## 2020-12-07 NOTE — PROGRESS NOTE ADULT - SUBJECTIVE AND OBJECTIVE BOX
Nurse Practitioner Progress note:     INTERVAL HISTORY: 80 year old female with h/o STEMI 2016 with mLAD stent now with c/o DE LA O with abnormal stress test.     MEDICATIONS:  metoprolol tartrate 50 milliGRAM(s) Oral at bedtime  metoprolol tartrate 100 milliGRAM(s) Oral daily  loratadine 10 milliGRAM(s) Oral daily  clonazePAM  Tablet 0.5 milliGRAM(s) Oral two times a day PRN  zaleplon 5 milliGRAM(s) Oral at bedtime PRN  pantoprazole    Tablet 40 milliGRAM(s) Oral before breakfast  atorvastatin 80 milliGRAM(s) Oral at bedtime  fenofibrate Tablet 145 milliGRAM(s) Oral daily  levothyroxine 125 MICROGram(s) Oral daily  ascorbic acid 500 milliGRAM(s) Oral daily  aspirin  chewable 81 milliGRAM(s) Oral daily  cholecalciferol 2000 Unit(s) Oral daily  multivitamin 1 Tablet(s) Oral daily  rivaroxaban 20 milliGRAM(s) Oral with dinner  sodium chloride 0.9% Bolus 300 milliLiter(s) IV Bolus once  sodium chloride 0.9%. 1000 milliLiter(s) IV Continuous <Continuous>      TELEMETRY: Afib 83 bpm.  No acute changes    T(C): 36.7 (12-07-20 @ 11:05), Max: 36.7 (12-07-20 @ 11:05)  HR: 79 (12-07-20 @ 13:30) (79 - 80)  BP: 131/75 (12-07-20 @ 13:30) (131/75 - 144/92)  RR: 18 (12-07-20 @ 13:30) (18 - 24)  SpO2: 100% (12-07-20 @ 13:30) (96% - 100%)  Wt(kg): --    PHYSICAL EXAM:  Appearance: Normal	  Cardiovascular: Normal S1 S2, No JVD, No murmurs, No edema  Respiratory: Lungs clear to auscultation	  Psychiatry: A & O x 3, Mood & affect appropriate. anxiety  Gastrointestinal:  Soft, Non-tender, + BS	  Skin: No rashes, No ecchymoses, No cyanosis  Neurologic: Non-focal, A&O X3.  No neuro deficits  Extremities: Normal range of motion, No clubbing, cyanosis or edema  Vascular: Peripheral pulses palpable 2+ bilaterally  Procedure Site: Right groin with angioseal closure device benign.  No bleeding/hematoma/ecchymosis.  + palp pedal pulse.  Toes warm and mobile.  Right radial site with very small ecchymosis. No bleeding or hematoma. + palp radial pulse.  Fingers warm and mobile    12 lead EKG:  	Afib 83 bpm.  No acute changes    MEDICATION DURING PROCEDURE;  VERSED 3MG  FENTANYL 200 MCG  HEPARIN 8000 U  PLAVIX 600 MG  Omnipaque 90 cc      PROCEDURE RESULTS: S/P PCI with BARAK X1 pLAD (SYNERGY 3.5MM X 16MM) via right groin with angioseal closure device.    Unsuccessful radial approach.  LM=normal  MLAD=patent  mCirc=patent  RCA 40-50% stenosis  LVEDP=23      Full report to follow    On arrival to holding pt c/o chest pressure, EKG unchanged.  Dr. Franco at bedside and NTP 1" applied with relief noted.     ASSESSMENT/PLAN:   Admit to tele d/t pLAD lesion, age >75, stage III CKD, and on anti-coagulation.	  -Groin precautions  -Bedrest X 2 hours  -Resume home meds  -D/C aspirin  -Initiate plavix  -Follow up with Dr. Franco  -cardiac rehab info provided/referral and communication to cardiac rehab completed  -Check labs/EKG/site check in AM  -Probable discharge in AM

## 2020-12-08 VITALS
HEART RATE: 80 BPM | OXYGEN SATURATION: 95 % | DIASTOLIC BLOOD PRESSURE: 60 MMHG | RESPIRATION RATE: 16 BRPM | TEMPERATURE: 98 F | SYSTOLIC BLOOD PRESSURE: 141 MMHG

## 2020-12-08 LAB
ANION GAP SERPL CALC-SCNC: 7 MMOL/L — SIGNIFICANT CHANGE UP (ref 5–17)
BUN SERPL-MCNC: 24 MG/DL — HIGH (ref 8–20)
CALCIUM SERPL-MCNC: 8.9 MG/DL — SIGNIFICANT CHANGE UP (ref 8.6–10.2)
CHLORIDE SERPL-SCNC: 103 MMOL/L — SIGNIFICANT CHANGE UP (ref 98–107)
CO2 SERPL-SCNC: 29 MMOL/L — SIGNIFICANT CHANGE UP (ref 22–29)
CREAT SERPL-MCNC: 0.92 MG/DL — SIGNIFICANT CHANGE UP (ref 0.5–1.3)
GLUCOSE SERPL-MCNC: 109 MG/DL — HIGH (ref 70–99)
HCT VFR BLD CALC: 42.2 % — SIGNIFICANT CHANGE UP (ref 34.5–45)
HGB BLD-MCNC: 14 G/DL — SIGNIFICANT CHANGE UP (ref 11.5–15.5)
MAGNESIUM SERPL-MCNC: 1.6 MG/DL — SIGNIFICANT CHANGE UP (ref 1.6–2.6)
MCHC RBC-ENTMCNC: 31 PG — SIGNIFICANT CHANGE UP (ref 27–34)
MCHC RBC-ENTMCNC: 33.2 GM/DL — SIGNIFICANT CHANGE UP (ref 32–36)
MCV RBC AUTO: 93.4 FL — SIGNIFICANT CHANGE UP (ref 80–100)
PLATELET # BLD AUTO: 139 K/UL — LOW (ref 150–400)
POTASSIUM SERPL-MCNC: 4.7 MMOL/L — SIGNIFICANT CHANGE UP (ref 3.5–5.3)
POTASSIUM SERPL-SCNC: 4.7 MMOL/L — SIGNIFICANT CHANGE UP (ref 3.5–5.3)
RBC # BLD: 4.52 M/UL — SIGNIFICANT CHANGE UP (ref 3.8–5.2)
RBC # FLD: 13.1 % — SIGNIFICANT CHANGE UP (ref 10.3–14.5)
SODIUM SERPL-SCNC: 139 MMOL/L — SIGNIFICANT CHANGE UP (ref 135–145)
WBC # BLD: 5.38 K/UL — SIGNIFICANT CHANGE UP (ref 3.8–10.5)
WBC # FLD AUTO: 5.38 K/UL — SIGNIFICANT CHANGE UP (ref 3.8–10.5)

## 2020-12-08 PROCEDURE — 93010 ELECTROCARDIOGRAM REPORT: CPT

## 2020-12-08 PROCEDURE — 99232 SBSQ HOSP IP/OBS MODERATE 35: CPT

## 2020-12-08 RX ORDER — PANTOPRAZOLE SODIUM 20 MG/1
1 TABLET, DELAYED RELEASE ORAL
Qty: 0 | Refills: 0 | DISCHARGE
Start: 2020-12-08

## 2020-12-08 RX ORDER — RIVAROXABAN 15 MG-20MG
1 KIT ORAL
Qty: 90 | Refills: 3
Start: 2020-12-08 | End: 2021-12-02

## 2020-12-08 RX ORDER — CLOPIDOGREL BISULFATE 75 MG/1
1 TABLET, FILM COATED ORAL
Qty: 90 | Refills: 4
Start: 2020-12-08 | End: 2022-03-02

## 2020-12-08 RX ORDER — ATORVASTATIN CALCIUM 80 MG/1
1 TABLET, FILM COATED ORAL
Qty: 0 | Refills: 0 | DISCHARGE
Start: 2020-12-08

## 2020-12-08 RX ADMIN — Medication 0.5 MILLIGRAM(S): at 01:37

## 2020-12-08 RX ADMIN — Medication 40 MILLIGRAM(S): at 06:07

## 2020-12-08 RX ADMIN — Medication 1 INCH(S): at 01:37

## 2020-12-08 RX ADMIN — Medication 100 MILLIGRAM(S): at 06:04

## 2020-12-08 RX ADMIN — Medication 125 MICROGRAM(S): at 06:04

## 2020-12-08 RX ADMIN — PANTOPRAZOLE SODIUM 40 MILLIGRAM(S): 20 TABLET, DELAYED RELEASE ORAL at 06:04

## 2020-12-08 NOTE — PROGRESS NOTE ADULT - SUBJECTIVE AND OBJECTIVE BOX
Department of Cardiology                                              [facility] [address] [id number]                                       Telephone: [phone]. Fax:[phone]                                                       Cardiac Cath NP Note       Narrative:    80 year old female with h/o Aifb on xarelto, asthma, HTN thyroid disease, varicose veins, PNA wiht hematochezia, prior STEMI 2016 with BARAK to mLAD who c/o DE LA O. Patient now s/p Left Heart Catheterization which showed midLAD with 0% in stent stenosis; Moderate RCA disease. Now with severe proximal LAD with PCI x1 BARAK. Patient will start Plavix and continue with Xarelto for hx of Atrial Fibrillation.   LVEDP [id number]. Denies chest pain, sob/de la o, palpitations.         PAST MEDICAL & SURGICAL HISTORY:  Asthma  H/O thyroid disease  Anxiety  Abnormal stress test  DE LA O (dyspnea on exertion)  Hypothyroid  Stented coronary artery  High cholesterol  Myocardial infarction  has 2 stents  Essential hypertension  Afib  H/O tubal ligation  Family history of PTCA  with 2 stents  S/P appendectomy  S/P tonsillectomy      FAMILY HISTORY:  FH: heart disease      Home Medications:  Allegra 180 mg oral tablet: 1 tab(s) orally once a day (07 Dec 2020 19:09)  atorvastatin 80 mg oral tablet: 1 tab(s) orally once a day (at bedtime) (08 Dec 2020 07:17)  clonazePAM 0.5 mg oral tablet: 1 tab(s) orally 2 times a day, As Needed (07 Dec 2020 11:29)  fenofibrate 145 mg oral tablet: 1 tab(s) orally once a day (07 Dec 2020 11:29)  furosemide 40 mg oral tablet: 1 tab(s) orally 3 times a week sun tues thurs sat (07 Dec 2020 19:09)  furosemide 80 mg oral tablet: orally 3 times a week mon wed fri (07 Dec 2020 19:09)  lansoprazole 30 mg oral delayed release capsule: 30 milligram(s) orally once a day (07 Dec 2020 19:09)  levothyroxine 125 mcg (0.125 mg) oral tablet: 1 tab(s) orally once a day (07 Dec 2020 11:29)  metoprolol tartrate 50 mg oral tablet: 1 tab(s) orally once a day (at bedtime) (07 Dec 2020 19:09)  Metoprolol Tartrate 50 mg oral tablet: 2 tab(s) orally once a day (07 Dec 2020 19:09)  Multiple Vitamins oral tablet: 1 tab(s) orally once a day (07 Dec 2020 19:09)  pantoprazole 40 mg oral delayed release tablet: 1 tab(s) orally once a day (before a meal) (08 Dec 2020 07:17)  Probiotic Formula oral capsule: 1 cap(s) orally once a day (07 Dec 2020 19:09)  rosuvastatin 20 mg oral tablet: 1 tab(s) orally once a day (07 Dec 2020 19:09)  Stool Softener with Laxative 50 mg-8.6 mg oral tablet: 1 tab(s) orally once a day (at bedtime) (07 Dec 2020 19:09)  Vitamin C 1000 mg oral tablet: 1 tab(s) orally twice per  day (07 Dec 2020 11:29)  Vitamin D3 2000 intl units oral capsule: 1 cap(s) orally once a day (07 Dec 2020 11:29)  zaleplon 10 mg oral capsule: 1 cap(s) orally once a day (at bedtime) (07 Dec 2020 11:29)                      14.0   5.38  )-----------( 139      ( 08 Dec 2020 06:02 )             42.2     12-08    139  |  103  |  24.0<H>  ----------------------------<  109<H>  4.7   |  29.0  |  0.92    Ca    8.9      08 Dec 2020 06:02  Mg     1.6     12-08      PT/INR - ( 07 Dec 2020 10:39 )   PT: 12.1 sec;   INR: 1.05 ratio    PTT - ( 07 Dec 2020 10:39 )  PTT:27.8 sec         General: No fatigue, no fevers/chills  Respiratory: No dyspnea, no cough, no wheeze  CV: No chest pain, no palpitations  Abd: No nausea  Neuro: No headache, no dizziness  latex (Anaphylaxis)  penicillin (Rash)  tetanus-diphtheria toxoids (Unknown)      Objective:  Vital Signs Last 24 Hrs  T(C): 36.7 (08 Dec 2020 06:10), Max: 36.7 (07 Dec 2020 11:05)  T(F): 98 (08 Dec 2020 06:10), Max: 98 (07 Dec 2020 11:05)  HR: 80 (08 Dec 2020 06:10) (72 - 86)  BP: 141/60 (08 Dec 2020 06:10) (111/68 - 147/72)  BP(mean): --  RR: 16 (08 Dec 2020 06:10) (16 - 24)  SpO2: 95% (08 Dec 2020 06:10) (92% - 100%)    CM: SR  Neuro: A&OX3, CN 2-12 intact  HEENT: NC, AT  Lungs: CTA B/L  CV: S1, S2, no murmur, RRR  Abd: Soft  Right Groin: Soft, no bleeding, no hematoma; slight ecchymosis   Extremity: + distal pulses  EKG:   Atrial Fibrillation          DIAGNOSTICS:    Assessment of LVEF:       EF: 62%       Assessed by: TTE       Date: 8/31/2020    Prior Cardiac Interventions:       PCI's: 2016 BARAK X1 mLAD       CABG: N/A    Noninvasive Testing:   Stress Test: Date: 11/17/20 - Myocardial Imaging: mod ischemia in circumflex territory      < from: Cardiac Cath Lab - Adult (12.07.20 @ 11:57) >  PROCEDURE:  --  Left heart catheterization.  --  Left coronary angiography.  --  Right coronary angiography.  --  Diagnostic IVUS.  --  Sonosite.  --  Interventional IVUS.  --  Hemostasis with Angioseal-Intervention.  --  Intervention on proximal LAD: drug-eluting stent.      VENTRICLES: No LV gram was performed; however, a recent echocardiogram  demonstrated an EF of 55 %.  CORONARY VESSELS: The coronary circulation is right dominant.  LM:   --  LM: Normal.  LAD:   --  Proximal LAD: There was a tubular 80 % stenosis.  --  Mid LAD: There was a 0 % stenosis in-stent.  CX:  --  Proximal circumflex: There was a diffuse 40 % stenosis.  RCA:   --  Proximal RCA: There was a 40 % stenosis.  --  Mid RCA: There was a diffuse 50 % stenosis.  COMPLICATIONS: No complications occurred during the cath lab visit.  DIAGNOSTIC IMPRESSIONS: LVEDP [id number].  Moderate RCA disease.  Severe proximal LAD. PCI with 1 BARAK.  DIAGNOSTIC RECOMMENDATIONS: Plavix, Xarelto. No aspirin due to elevated  bleeding risk with triple therapy.  ? of afib as underlying etiology for CHF as well. Tachycardia during   procedure.  Plan for outpatient monitoring.  INTERVENTIONAL IMPRESSIONS: LVEDP [id number].  Moderate RCA disease.  Severe proximal LAD. PCI with 1 BARAK.  INTERVENTIONAL RECOMMENDATIONS: Plavix, Xarelto. No aspirin dueto elevated  bleeding risk with triple therapy.  ? of afib as underlying etiology for CHF as well. Tachycardia during   procedure.  Plan for outpatient monitoring.  Prepared and signed by  Dion Franco MD  Signed 12/07/2020 17:03:53    < end of copied text >    ASSESSMENT AND PLAN:    80 year old female with h/o Aifb on xarelto, asthma, HTN thyroid disease, varicose veins, PNA wiht hematochezia, prior STEMI 2016 with BARAK to mLAD who c/o DE LA O. Patient now s/p Left Heart Catheterization which showed Moderate RCA disease, mid LAD with 0% in stent stenosis; now with severe proximal LAD with PCI x1 BARAK. Patient will start Plavix and continue with Xarelto for hx of Atrial Fibrillation.        -pt seen and examined, no events on telemetry overnight  -right radial without hematoma; has bruising at insertion site, palpable pulses   -right radial precautions reviewed with pt   -right groin stable, no hematoma, slight ecchymosis at insertion site  -pt instructed on groin precautions for the next 3-5 days  -s/p LHC with 1 BARAK to pLAD and moderate RCA disease   -will start on SAPT with Plavix 75 mg daily - Rx sent to pts pharmacy  -pt with hx of Atrial Fibrillation currently on Xarelto 20 mg daily   -Aspirin discontinued secondary to high risk of bleeding if on triple therapy (reviewed risks/reccs with pt)   -pt to follow after hospital discharge with Dr. Franco within 1-2 weeks   -cardiac rehab info provided/referral and communication to cardiac rehab completed

## 2020-12-11 ENCOUNTER — RX RENEWAL (OUTPATIENT)
Age: 80
End: 2020-12-11

## 2020-12-28 PROCEDURE — C1769: CPT

## 2020-12-28 PROCEDURE — C1725: CPT

## 2020-12-28 PROCEDURE — 80048 BASIC METABOLIC PNL TOTAL CA: CPT

## 2020-12-28 PROCEDURE — 86901 BLOOD TYPING SEROLOGIC RH(D): CPT

## 2020-12-28 PROCEDURE — 93005 ELECTROCARDIOGRAM TRACING: CPT

## 2020-12-28 PROCEDURE — 85027 COMPLETE CBC AUTOMATED: CPT

## 2020-12-28 PROCEDURE — C1760: CPT

## 2020-12-28 PROCEDURE — 83735 ASSAY OF MAGNESIUM: CPT

## 2020-12-28 PROCEDURE — 92978 ENDOLUMINL IVUS OCT C 1ST: CPT | Mod: LD

## 2020-12-28 PROCEDURE — C1894: CPT

## 2020-12-28 PROCEDURE — 93458 L HRT ARTERY/VENTRICLE ANGIO: CPT | Mod: XU

## 2020-12-28 PROCEDURE — 86900 BLOOD TYPING SEROLOGIC ABO: CPT

## 2020-12-28 PROCEDURE — C9600: CPT | Mod: LD

## 2020-12-28 PROCEDURE — C1753: CPT

## 2020-12-28 PROCEDURE — C1887: CPT

## 2020-12-28 PROCEDURE — 85730 THROMBOPLASTIN TIME PARTIAL: CPT

## 2020-12-28 PROCEDURE — 85610 PROTHROMBIN TIME: CPT

## 2020-12-28 PROCEDURE — 86850 RBC ANTIBODY SCREEN: CPT

## 2020-12-28 PROCEDURE — C1874: CPT

## 2020-12-28 PROCEDURE — 85025 COMPLETE CBC W/AUTO DIFF WBC: CPT

## 2020-12-28 PROCEDURE — 99153 MOD SED SAME PHYS/QHP EA: CPT

## 2020-12-28 PROCEDURE — 36415 COLL VENOUS BLD VENIPUNCTURE: CPT

## 2020-12-28 PROCEDURE — 99152 MOD SED SAME PHYS/QHP 5/>YRS: CPT

## 2021-01-08 ENCOUNTER — NON-APPOINTMENT (OUTPATIENT)
Age: 81
End: 2021-01-08

## 2021-01-14 ENCOUNTER — NON-APPOINTMENT (OUTPATIENT)
Age: 81
End: 2021-01-14

## 2021-01-14 ENCOUNTER — APPOINTMENT (OUTPATIENT)
Dept: CARDIOLOGY | Facility: CLINIC | Age: 81
End: 2021-01-14
Payer: MEDICARE

## 2021-01-14 VITALS — HEART RATE: 78 BPM | DIASTOLIC BLOOD PRESSURE: 85 MMHG | SYSTOLIC BLOOD PRESSURE: 133 MMHG

## 2021-01-14 DIAGNOSIS — I48.0 PAROXYSMAL ATRIAL FIBRILLATION: ICD-10-CM

## 2021-01-14 PROBLEM — R06.00 DYSPNEA, UNSPECIFIED: Chronic | Status: ACTIVE | Noted: 2020-12-07

## 2021-01-14 PROBLEM — F41.9 ANXIETY DISORDER, UNSPECIFIED: Chronic | Status: ACTIVE | Noted: 2020-12-07

## 2021-01-14 PROBLEM — J45.909 UNSPECIFIED ASTHMA, UNCOMPLICATED: Chronic | Status: ACTIVE | Noted: 2020-12-07

## 2021-01-14 PROBLEM — R94.39 ABNORMAL RESULT OF OTHER CARDIOVASCULAR FUNCTION STUDY: Chronic | Status: ACTIVE | Noted: 2020-12-07

## 2021-01-14 PROBLEM — Z86.39 PERSONAL HISTORY OF OTHER ENDOCRINE, NUTRITIONAL AND METABOLIC DISEASE: Chronic | Status: ACTIVE | Noted: 2020-12-07

## 2021-01-14 PROCEDURE — 99215 OFFICE O/P EST HI 40 MIN: CPT

## 2021-01-14 RX ORDER — FUROSEMIDE 80 MG/1
80 TABLET ORAL
Qty: 60 | Refills: 5 | Status: DISCONTINUED | COMMUNITY
End: 2021-01-14

## 2021-01-14 RX ORDER — CLONAZEPAM 0.5 MG/1
0.5 TABLET ORAL
Refills: 0 | Status: ACTIVE | COMMUNITY

## 2021-01-14 RX ORDER — ZALEPLON 10 MG/1
10 CAPSULE ORAL
Refills: 0 | Status: ACTIVE | COMMUNITY

## 2021-01-14 RX ORDER — COLD-HOT PACK
EACH MISCELLANEOUS DAILY
Refills: 0 | Status: ACTIVE | COMMUNITY

## 2021-01-28 LAB
ANION GAP SERPL CALC-SCNC: 14 MMOL/L
BUN SERPL-MCNC: 24 MG/DL
CALCIUM SERPL-MCNC: 10.5 MG/DL
CHLORIDE SERPL-SCNC: 99 MMOL/L
CHOLEST SERPL-MCNC: 155 MG/DL
CO2 SERPL-SCNC: 28 MMOL/L
CREAT SERPL-MCNC: 1.3 MG/DL
GLUCOSE SERPL-MCNC: 108 MG/DL
HDLC SERPL-MCNC: 55 MG/DL
LDLC SERPL CALC-MCNC: 70 MG/DL
NONHDLC SERPL-MCNC: 100 MG/DL
NT-PROBNP SERPL-MCNC: 1380 PG/ML
POTASSIUM SERPL-SCNC: 3.9 MMOL/L
SODIUM SERPL-SCNC: 142 MMOL/L
TRIGL SERPL-MCNC: 150 MG/DL

## 2021-02-11 ENCOUNTER — NON-APPOINTMENT (OUTPATIENT)
Age: 81
End: 2021-02-11

## 2021-02-11 ENCOUNTER — APPOINTMENT (OUTPATIENT)
Dept: CARDIOLOGY | Facility: CLINIC | Age: 81
End: 2021-02-11
Payer: MEDICARE

## 2021-02-11 VITALS
DIASTOLIC BLOOD PRESSURE: 82 MMHG | HEART RATE: 88 BPM | TEMPERATURE: 98.1 F | BODY MASS INDEX: 29.73 KG/M2 | HEIGHT: 66 IN | SYSTOLIC BLOOD PRESSURE: 148 MMHG | WEIGHT: 185 LBS | OXYGEN SATURATION: 97 %

## 2021-02-11 DIAGNOSIS — R06.00 DYSPNEA, UNSPECIFIED: ICD-10-CM

## 2021-02-11 PROCEDURE — 93000 ELECTROCARDIOGRAM COMPLETE: CPT

## 2021-02-11 PROCEDURE — 99214 OFFICE O/P EST MOD 30 MIN: CPT

## 2021-02-11 RX ORDER — FUROSEMIDE 40 MG/1
40 TABLET ORAL
Refills: 0 | Status: ACTIVE | COMMUNITY

## 2021-02-11 RX ORDER — LANSOPRAZOLE 30 MG/1
30 CAPSULE, DELAYED RELEASE ORAL DAILY
Refills: 0 | Status: ACTIVE | COMMUNITY

## 2021-02-11 RX ORDER — LANSOPRAZOLE 30 MG/1
30 CAPSULE, DELAYED RELEASE ORAL DAILY
Refills: 0 | Status: DISCONTINUED | COMMUNITY
End: 2021-02-11

## 2021-02-11 RX ORDER — FUROSEMIDE 80 MG/1
80 TABLET ORAL
Qty: 90 | Refills: 1 | Status: ACTIVE | COMMUNITY
Start: 1900-01-01 | End: 1900-01-01

## 2021-05-30 NOTE — ED ADULT NURSE NOTE - CAS TRG GENERAL NORM CIRC DET
Left message to call back for: results   Information to relay to patient:  Below message   Strong peripheral pulses/Capillary refill less/equal to 2 seconds

## 2021-06-17 ENCOUNTER — APPOINTMENT (OUTPATIENT)
Dept: CARDIOLOGY | Facility: CLINIC | Age: 81
End: 2021-06-17
Payer: MEDICARE

## 2021-06-17 VITALS
BODY MASS INDEX: 29.73 KG/M2 | SYSTOLIC BLOOD PRESSURE: 128 MMHG | WEIGHT: 185 LBS | OXYGEN SATURATION: 98 % | HEIGHT: 66 IN | DIASTOLIC BLOOD PRESSURE: 82 MMHG | TEMPERATURE: 97.9 F | HEART RATE: 82 BPM

## 2021-06-17 DIAGNOSIS — R00.2 PALPITATIONS: ICD-10-CM

## 2021-06-17 PROCEDURE — 93000 ELECTROCARDIOGRAM COMPLETE: CPT

## 2021-06-17 PROCEDURE — 99215 OFFICE O/P EST HI 40 MIN: CPT

## 2021-06-17 RX ORDER — ROSUVASTATIN CALCIUM 20 MG/1
20 TABLET, FILM COATED ORAL DAILY
Qty: 30 | Refills: 5 | Status: ACTIVE | COMMUNITY
Start: 2019-09-19

## 2021-06-17 RX ORDER — BACILLUS COAGULANS/INULIN 1B-250 MG
CAPSULE ORAL DAILY
Refills: 0 | Status: ACTIVE | COMMUNITY

## 2021-06-17 RX ORDER — CLOPIDOGREL BISULFATE 75 MG/1
75 TABLET, FILM COATED ORAL
Qty: 90 | Refills: 1 | Status: DISCONTINUED | COMMUNITY
End: 2021-06-17

## 2021-06-17 RX ORDER — ASPIRIN 81 MG/1
81 TABLET, CHEWABLE ORAL
Refills: 0 | Status: ACTIVE | COMMUNITY
Start: 2021-06-17

## 2021-06-19 NOTE — ED ADULT NURSE NOTE - CHIEF COMPLAINT QUOTE
Attempt 2: Care Guide called patient.  If this patient is returning my call, please transfer to the care guide at ext [phone].    Next outreach 7/5/18   pt arrive by ambulance on CPAP with reports of severe respiratory distress, woke up with bright red frothy sputum, SpO2 in 80's on RA.

## 2021-08-31 NOTE — ED ADULT TRIAGE NOTE - HEART RATE (BEATS/MIN)
Medicare Wellness Visit  Plan for Preventive Care    A good way for you to stay healthy is to use preventive care.  Medicare covers many services that can help you stay healthy.* The goal of these services is to find any health problems as quickly as possible. Finding problems early can help make them easier to treat.  Your personal plan below lists the services you may need and when they are due.     Health Maintenance Summary     DM/CKD Microalbumin (Yearly)  Overdue - never done    Breast Cancer Screening (Every 2 Years)  Order placed this encounter    Medicare Wellness Visit (Yearly)  Due since 8/5/2021    Influenza Vaccine (1)  Next due on 9/1/2021    Colorectal Cancer Screen- (Colonoscopy - Every 10 Years)  Next due on 8/8/2022    DM/CKD GFR (Yearly)  Next due on 8/27/2022    Depression Screening (Yearly)  Next due on 8/31/2022    DTaP/Tdap/Td Vaccine (2 - Td or Tdap)  Next due on 4/26/2026    Pneumococcal Vaccine 65+   Completed    Hepatitis C Screening   Completed    Shingles Vaccine   Completed    Osteoporosis Screening   Completed    COVID-19 Vaccine   Completed    Hepatitis B Vaccine   Aged Out    Meningococcal Vaccine   Aged Out    HPV Vaccine   Aged Out           Preventive Care for Women and Men    Heart Screenings (Cardiovascular):  · Blood tests are used to check your cholesterol, lipid and triglyceride levels. High levels can increase your risk for heart disease and stroke. High levels can be treated with medications, diet and exercise. Lowering your levels can help keep your heart and blood vessels healthy.  Your provider will order these tests if they are needed.    · An ultrasound is done to see if you have an abdominal aortic aneurysm (AAA).  This is an enlargement of one of the main blood vessels that delivers blood to the body.   In the [place], 9,000 deaths are caused by AAA.  You may not even know you have this problem and as many as 1 in 3 people will have a serious problem if it is  not treated.  Early diagnosis allows for more effective treatment and cure.  If you have a family history of AAA or are a male age 65-75 who has smoked, you are at higher risk of an AAA.  Your provider can order this test, if needed.    Colorectal Screening:  · There are many tests that are used to check for cancer of your colon and rectum. You and your provider should discuss what test is best for you and when to have it done.  Options include:  · Screening Colonoscopy: exam of the entire colon, seen through a flexible lighted tube.  · Flexible Sigmoidoscopy: exam of the last third (sigmoid portion) of the colon and rectum, seen through a flexible lighted tube.  · Cologuard DNA stool test: a sample of your stool is used to screen for cancer and unseen blood in your stool.  · Fecal Occult Blood Test: a sample of your stool is studied to find any unseen blood    Flu Shot:  · An immunization that helps to prevent influenza (the flu). You should get this every year. The best time to get the shot is in the fall.    Pneumococcal Shot:  • Vaccines are available that can help prevent pneumococcal disease, which is any type of infection caused by Streptococcus pneumoniae bacteria.   Their use can prevent some cases of pneumonia, meningitis, and sepsis. There are two types of pneumococcal vaccines:   o Conjugate vaccines (PCV-13 or Prevnar 13®) - helps protect against the 13 types of pneumococcal bacteria that are the most common causes of serious infections in children and adults.    o Polysaccharide vaccine (PPSV23 or Uciryjpcg60®) - helps protect against 23 types of pneumococcal bacteria for patients who are recommended to get it.  These vaccines should be given at least 12 months apart.  A booster is usually not needed.     Hepatitis B Shot:  · An immunization that helps to protect people from getting Hepatitis B. Hepatitis B is a virus that spreads through contact with infected blood or body fluids. Many people with the  virus do not have symptoms.  The virus can lead to serious problems, such as liver disease. Some people are at higher risk than others. Your doctor will tell you if you need this shot.     Diabetes Screening:  · A test to measure sugar (glucose) in your blood is called a fasting blood sugar. Fasting means you cannot have food or drink for at least 8 hours before the test. This test can detect diabetes long before you may notice symptoms.    Glaucoma Screening:  · Glaucoma screening is performed by your eye doctor. The test measures the fluid pressure inside your eyes to determine if you have glaucoma.     Hepatitis C Screening:  · A blood test to see if you have the hepatitis C virus.  Hepatitis C attacks the liver and is a major cause of chronic liver disease.  Medicare will cover a single screening for all adults born between 1945 & 1965, or high risk patients (people who have injected illegal drugs or people who have had blood transfusions).  High risk patients who continue to inject illegal drugs can be screened for Hepatitis C every year.    Smoking and Tobacco-Use Cessation Counseling:  · Tobacco is the single greatest cause of disease and early death in our country today. Medication and counseling together can increase a person’s chance of quitting for good.   · Medicare covers two quitting attempts per year, with four counseling sessions per attempt (eight sessions in a 12 month period)    Preventive Screening tests for Women    Screening Mammograms and Breast Exams:  · An x-ray of your breasts to check for breast cancer before you or your doctor may be able to feel it.  If breast cancer is found early it can usually be treated with success.    Pelvic Exams and Pap Tests:  · An exam to check for cervical and vaginal cancer. A Pap test is a lab test in which cells are taken from your cervix and sent to the lab to look for signs of cervical cancer. If cancer of the cervix is found early, chances for a cure are  good. Testing can generally end at age 65, or if a woman has a hysterectomy for a benign condition. Your provider may recommend more frequent testing if certain abnormal results are found.    Bone Mass Measurements:  · A painless x-ray of your bone density to see if you are at risk for a broken bone. Bone density refers to the thickness of bones or how tightly the bone tissue is packed.    Preventive Screening tests for Men    Prostate Screening:  · Should you have a prostate cancer test (PSA)?  It is up to you to decide if you want a prostate cancer test. Talk to your clinician to find out if the test is right for you.  Things for you to consider and talk about should include:  · Benefits and harms of the test  · Your family history  · How your race/ethnicity may influence the test  · If the test may impact other medical conditions you have  · Your values on screenings and treatments    *Medicare pays for many preventive services to keep you healthy. For some of these services, you might have to pay a deductible, coinsurance, and / or copayment.  The amounts vary depending on the type of services you need and the kind of Medicare health plan you have.    For further details on screenings offered by Medicare please visit: https://www.medicare.gov/coverage/preventive-screening-services     Patient Education     4 Steps for Eating Healthier  Changing the way you eat can improve your health. It can lower your cholesterol and blood pressure, and help you stay at a healthy weight. Your diet doesn’t have to be bland and boring to be healthy. Just watch your calories and follow these steps:    Step 1. Eat fewer unhealthy fats  · Choose more fish and lean meats instead of fatty cuts of meat.  · Skip butter and lard, and use less margarine.  · Pass on foods that have palm, coconut, or hydrogenated oils.  · Eat fewer high-fat dairy foods like cheese, ice cream, and whole milk.  · Get a heart-healthy cookbook and try some  low-fat recipes.  Step 2. Go light on salt  · Keep the saltshaker off the table.  · Limit high-salt ingredients, such as soy sauce, bouillon, and garlic salt.  · Instead of adding salt when cooking, season your food with herbs and flavorings. Try lemon, garlic, and onion, or salt-free herb seasonings.  · Limit convenience foods, such as boxed or canned foods and restaurant food.  · Read food labels and choose lower-sodium options.  Step 3. Limit sugar  · Pause before you add sugars to pancakes, cereal, coffee, or tea. This includes white and brown table sugar, syrup, honey, and molasses. Cut your usual amount by half.  · Use non-sugar sweeteners. Stevia, aspartame, and sucralose can satisfy a sweet tooth without adding calories.  · Swap out sugar-filled soda and other drinks. Buy sugar-free or low-calorie beverages. Remember water is always the best choice.  · Read labels and choose foods with less added sugar. Keep in mind that dairy foods and foods with fruit will have some natural sugar.  · Cut the sugar in recipes by 1/3 to 1/2. Boost the flavor with extracts like almond, vanilla, or orange. Or add spices such as cinnamon or nutmeg.  Step 4. Eat more fiber  · Eat fresh fruits and vegetables every day.  · Boost your diet with whole grains. Go for oats, whole-grain rice, and bran.  · Add beans and lentils to your meals.  · Drink more water to match your fiber increase to help prevent constipation.  Date Last Reviewed: 6/1/2017  © 6254-8436 [employer] [address] [place] [address], [place] [address]. All rights reserved. This information is not intended as a substitute for professional medical care. Always follow your healthcare professional's instructions.           [facility]  Address: [address], [place] [address]   Phone: [phone]     75

## 2021-09-14 NOTE — ASU PREOP CHECKLIST - TEMPERATURE IN FAHRENHEIT (DEGREES F)
97.3
I have personally seen and examined this patient.  I have fully participated in the care of this patient. I have reviewed all pertinent clinical information, including history, physical exam, plan and the Resident’s note and agree except as noted.

## 2022-01-06 ENCOUNTER — APPOINTMENT (OUTPATIENT)
Dept: CARDIOLOGY | Facility: CLINIC | Age: 82
End: 2022-01-06
Payer: MEDICARE

## 2022-01-06 ENCOUNTER — NON-APPOINTMENT (OUTPATIENT)
Age: 82
End: 2022-01-06

## 2022-01-06 VITALS
SYSTOLIC BLOOD PRESSURE: 126 MMHG | HEIGHT: 66 IN | HEART RATE: 61 BPM | DIASTOLIC BLOOD PRESSURE: 76 MMHG | OXYGEN SATURATION: 98 % | BODY MASS INDEX: 28.45 KG/M2 | RESPIRATION RATE: 14 BRPM | TEMPERATURE: 98 F | WEIGHT: 177 LBS

## 2022-01-06 PROCEDURE — 99215 OFFICE O/P EST HI 40 MIN: CPT

## 2022-01-06 PROCEDURE — 93000 ELECTROCARDIOGRAM COMPLETE: CPT

## 2022-01-20 ENCOUNTER — TRANSCRIPTION ENCOUNTER (OUTPATIENT)
Age: 82
End: 2022-01-20

## 2022-01-21 ENCOUNTER — APPOINTMENT (OUTPATIENT)
Dept: DISASTER EMERGENCY | Facility: HOSPITAL | Age: 82
End: 2022-01-21

## 2022-01-21 ENCOUNTER — OUTPATIENT (OUTPATIENT)
Dept: OUTPATIENT SERVICES | Facility: HOSPITAL | Age: 82
LOS: 1 days | End: 2022-01-21
Payer: MEDICARE

## 2022-01-21 VITALS
HEART RATE: 79 BPM | TEMPERATURE: 97 F | DIASTOLIC BLOOD PRESSURE: 94 MMHG | OXYGEN SATURATION: 99 % | HEIGHT: 66 IN | SYSTOLIC BLOOD PRESSURE: 141 MMHG | RESPIRATION RATE: 18 BRPM | WEIGHT: 175.05 LBS

## 2022-01-21 VITALS
SYSTOLIC BLOOD PRESSURE: 126 MMHG | HEART RATE: 75 BPM | DIASTOLIC BLOOD PRESSURE: 80 MMHG | OXYGEN SATURATION: 97 % | RESPIRATION RATE: 18 BRPM | TEMPERATURE: 97 F

## 2022-01-21 DIAGNOSIS — Z90.49 ACQUIRED ABSENCE OF OTHER SPECIFIED PARTS OF DIGESTIVE TRACT: Chronic | ICD-10-CM

## 2022-01-21 DIAGNOSIS — Z82.49 FAMILY HISTORY OF ISCHEMIC HEART DISEASE AND OTHER DISEASES OF THE CIRCULATORY SYSTEM: Chronic | ICD-10-CM

## 2022-01-21 DIAGNOSIS — Z90.89 ACQUIRED ABSENCE OF OTHER ORGANS: Chronic | ICD-10-CM

## 2022-01-21 DIAGNOSIS — Z98.51 TUBAL LIGATION STATUS: Chronic | ICD-10-CM

## 2022-01-21 DIAGNOSIS — U07.1 COVID-19: ICD-10-CM

## 2022-01-21 PROCEDURE — M0247: CPT

## 2022-01-21 RX ORDER — SOTROVIMAB 62.5 MG/ML
500 INJECTION, SOLUTION, CONCENTRATE INTRAVENOUS ONCE
Refills: 0 | Status: COMPLETED | OUTPATIENT
Start: 2022-01-21 | End: 2022-01-21

## 2022-01-21 RX ORDER — SODIUM CHLORIDE 9 MG/ML
250 INJECTION INTRAMUSCULAR; INTRAVENOUS; SUBCUTANEOUS
Refills: 0 | Status: DISCONTINUED | OUTPATIENT
Start: 2022-01-21 | End: 2022-02-05

## 2022-01-21 RX ADMIN — SODIUM CHLORIDE 100 MILLILITER(S): 9 INJECTION INTRAMUSCULAR; INTRAVENOUS; SUBCUTANEOUS at 14:32

## 2022-01-21 RX ADMIN — SOTROVIMAB 116 MILLIGRAM(S): 62.5 INJECTION, SOLUTION, CONCENTRATE INTRAVENOUS at 14:31

## 2022-02-11 ENCOUNTER — RX RENEWAL (OUTPATIENT)
Age: 82
End: 2022-02-11

## 2022-02-11 RX ORDER — FENOFIBRATE 145 MG/1
145 TABLET, COATED ORAL
Qty: 90 | Refills: 3 | Status: ACTIVE | COMMUNITY
Start: 2017-05-18 | End: 1900-01-01

## 2022-06-23 ENCOUNTER — NON-APPOINTMENT (OUTPATIENT)
Age: 82
End: 2022-06-23

## 2022-06-23 ENCOUNTER — APPOINTMENT (OUTPATIENT)
Dept: CARDIOLOGY | Facility: CLINIC | Age: 82
End: 2022-06-23
Payer: MEDICARE

## 2022-06-23 VITALS
DIASTOLIC BLOOD PRESSURE: 68 MMHG | BODY MASS INDEX: 28.93 KG/M2 | WEIGHT: 180 LBS | HEIGHT: 66 IN | OXYGEN SATURATION: 96 % | SYSTOLIC BLOOD PRESSURE: 122 MMHG | TEMPERATURE: 97.8 F | HEART RATE: 72 BPM

## 2022-06-23 DIAGNOSIS — I48.91 UNSPECIFIED ATRIAL FIBRILLATION: ICD-10-CM

## 2022-06-23 DIAGNOSIS — I10 ESSENTIAL (PRIMARY) HYPERTENSION: ICD-10-CM

## 2022-06-23 DIAGNOSIS — I25.10 ATHEROSCLEROTIC HEART DISEASE OF NATIVE CORONARY ARTERY W/OUT ANGINA PECTORIS: ICD-10-CM

## 2022-06-23 DIAGNOSIS — E78.00 PURE HYPERCHOLESTEROLEMIA, UNSPECIFIED: ICD-10-CM

## 2022-06-23 PROCEDURE — 93000 ELECTROCARDIOGRAM COMPLETE: CPT

## 2022-06-23 PROCEDURE — 99215 OFFICE O/P EST HI 40 MIN: CPT

## 2023-02-06 ENCOUNTER — NON-APPOINTMENT (OUTPATIENT)
Age: 83
End: 2023-02-06

## 2023-02-06 ENCOUNTER — RX RENEWAL (OUTPATIENT)
Age: 83
End: 2023-02-06

## 2023-02-09 ENCOUNTER — RX RENEWAL (OUTPATIENT)
Age: 83
End: 2023-02-09

## 2023-06-14 NOTE — ED ADULT NURSE NOTE - PATIENT DISCHARGE SIGNATURE
05-May-2017 Rinvoq Pregnancy And Lactation Text: Based on animal studies, Rinvoq may cause embryo-fetal harm when administered to pregnant women.  The medication should not be used in pregnancy.  Breastfeeding is not recommended during treatment and for 6 days after the last dose.

## 2024-03-21 NOTE — ED PROVIDER NOTE - SKIN COLOR
03/21/24 1454   PHQ-2/9 Depression Screening Adult   Little interest or pleasure in activity? 0   Feeling down, depressed or hopeless? 1   Initial depression screening score: 1   PHQ2 Interpretation No further screening needed        visible ecchymosis diffuse
